# Patient Record
Sex: MALE | Race: WHITE | NOT HISPANIC OR LATINO | Employment: OTHER | URBAN - METROPOLITAN AREA
[De-identification: names, ages, dates, MRNs, and addresses within clinical notes are randomized per-mention and may not be internally consistent; named-entity substitution may affect disease eponyms.]

---

## 2024-01-23 ENCOUNTER — APPOINTMENT (OUTPATIENT)
Dept: CARDIOLOGY | Facility: HOSPITAL | Age: 69
DRG: 243 | End: 2024-01-23
Payer: MEDICARE

## 2024-01-23 ENCOUNTER — APPOINTMENT (OUTPATIENT)
Dept: RADIOLOGY | Facility: HOSPITAL | Age: 69
DRG: 243 | End: 2024-01-23
Payer: MEDICARE

## 2024-01-23 ENCOUNTER — HOSPITAL ENCOUNTER (INPATIENT)
Facility: HOSPITAL | Age: 69
LOS: 2 days | Discharge: HOME | DRG: 243 | End: 2024-01-25
Attending: STUDENT IN AN ORGANIZED HEALTH CARE EDUCATION/TRAINING PROGRAM | Admitting: INTERNAL MEDICINE
Payer: MEDICARE

## 2024-01-23 DIAGNOSIS — R33.8 BENIGN PROSTATIC HYPERPLASIA WITH URINARY RETENTION: ICD-10-CM

## 2024-01-23 DIAGNOSIS — N13.39 OTHER HYDRONEPHROSIS: ICD-10-CM

## 2024-01-23 DIAGNOSIS — I44.2 THIRD DEGREE HEART BLOCK (MULTI): Primary | ICD-10-CM

## 2024-01-23 DIAGNOSIS — N40.1 BENIGN PROSTATIC HYPERPLASIA WITH URINARY RETENTION: ICD-10-CM

## 2024-01-23 LAB
ABO GROUP (TYPE) IN BLOOD: NORMAL
ALBUMIN SERPL BCP-MCNC: 4.9 G/DL (ref 3.4–5)
ALP SERPL-CCNC: 94 U/L (ref 33–136)
ALT SERPL W P-5'-P-CCNC: 35 U/L (ref 10–52)
ANION GAP BLDV CALCULATED.4IONS-SCNC: 4 MMOL/L (ref 10–25)
ANION GAP SERPL CALC-SCNC: 13 MMOL/L (ref 10–20)
ANTIBODY SCREEN: NORMAL
APTT PPP: 29 SECONDS (ref 27–38)
AST SERPL W P-5'-P-CCNC: 32 U/L (ref 9–39)
BASE EXCESS BLDV CALC-SCNC: 4.5 MMOL/L (ref -2–3)
BASOPHILS # BLD AUTO: 0.05 X10*3/UL (ref 0–0.1)
BASOPHILS NFR BLD AUTO: 0.5 %
BILIRUB SERPL-MCNC: 0.5 MG/DL (ref 0–1.2)
BNP SERPL-MCNC: 283 PG/ML (ref 0–99)
BODY TEMPERATURE: ABNORMAL
BUN SERPL-MCNC: 17 MG/DL (ref 6–23)
CA-I BLDV-SCNC: 1.3 MMOL/L (ref 1.1–1.33)
CALCIUM SERPL-MCNC: 10.5 MG/DL (ref 8.6–10.3)
CARDIAC TROPONIN I PNL SERPL HS: 28 NG/L (ref 0–20)
CARDIAC TROPONIN I PNL SERPL HS: 30 NG/L (ref 0–20)
CHLORIDE BLDV-SCNC: 103 MMOL/L (ref 98–107)
CHLORIDE SERPL-SCNC: 98 MMOL/L (ref 98–107)
CHOLEST SERPL-MCNC: 133 MG/DL (ref 0–199)
CHOLESTEROL/HDL RATIO: 3.3
CO2 SERPL-SCNC: 30 MMOL/L (ref 21–32)
CREAT SERPL-MCNC: 1.11 MG/DL (ref 0.5–1.3)
EGFRCR SERPLBLD CKD-EPI 2021: 72 ML/MIN/1.73M*2
EOSINOPHIL # BLD AUTO: 0.15 X10*3/UL (ref 0–0.7)
EOSINOPHIL NFR BLD AUTO: 1.6 %
ERYTHROCYTE [DISTWIDTH] IN BLOOD BY AUTOMATED COUNT: 13.5 % (ref 11.5–14.5)
FLUAV RNA RESP QL NAA+PROBE: NOT DETECTED
FLUBV RNA RESP QL NAA+PROBE: NOT DETECTED
GLUCOSE BLDV-MCNC: 87 MG/DL (ref 74–99)
GLUCOSE SERPL-MCNC: 81 MG/DL (ref 74–99)
HCO3 BLDV-SCNC: 32 MMOL/L (ref 22–26)
HCT VFR BLD AUTO: 46.8 % (ref 41–52)
HCT VFR BLD EST: 46 % (ref 41–52)
HDLC SERPL-MCNC: 40.5 MG/DL
HGB BLD-MCNC: 15.5 G/DL (ref 13.5–17.5)
HGB BLDV-MCNC: 15.3 G/DL (ref 13.5–17.5)
IMM GRANULOCYTES # BLD AUTO: 0.04 X10*3/UL (ref 0–0.7)
IMM GRANULOCYTES NFR BLD AUTO: 0.4 % (ref 0–0.9)
INHALED O2 CONCENTRATION: 21 %
INR PPP: 1 (ref 0.9–1.1)
LACTATE BLDV-SCNC: 1.6 MMOL/L (ref 0.4–2)
LDLC SERPL CALC-MCNC: 74 MG/DL
LYMPHOCYTES # BLD AUTO: 1.65 X10*3/UL (ref 1.2–4.8)
LYMPHOCYTES NFR BLD AUTO: 17.8 %
MCH RBC QN AUTO: 31.6 PG (ref 26–34)
MCHC RBC AUTO-ENTMCNC: 33.1 G/DL (ref 32–36)
MCV RBC AUTO: 95 FL (ref 80–100)
MONOCYTES # BLD AUTO: 0.96 X10*3/UL (ref 0.1–1)
MONOCYTES NFR BLD AUTO: 10.3 %
NEUTROPHILS # BLD AUTO: 6.44 X10*3/UL (ref 1.2–7.7)
NEUTROPHILS NFR BLD AUTO: 69.4 %
NON HDL CHOLESTEROL: 93 MG/DL (ref 0–149)
NRBC BLD-RTO: 0 /100 WBCS (ref 0–0)
OXYHGB MFR BLDV: 29.7 % (ref 45–75)
PCO2 BLDV: 58 MM HG (ref 41–51)
PH BLDV: 7.35 PH (ref 7.33–7.43)
PLATELET # BLD AUTO: 287 X10*3/UL (ref 150–450)
PO2 BLDV: 22 MM HG (ref 35–45)
POTASSIUM BLDV-SCNC: 4.8 MMOL/L (ref 3.5–5.3)
POTASSIUM SERPL-SCNC: 4.5 MMOL/L (ref 3.5–5.3)
PROT SERPL-MCNC: 8.5 G/DL (ref 6.4–8.2)
PROTHROMBIN TIME: 11.7 SECONDS (ref 9.8–12.8)
RBC # BLD AUTO: 4.91 X10*6/UL (ref 4.5–5.9)
RH FACTOR (ANTIGEN D): NORMAL
SAO2 % BLDV: 30 % (ref 45–75)
SARS-COV-2 RNA RESP QL NAA+PROBE: NOT DETECTED
SODIUM BLDV-SCNC: 134 MMOL/L (ref 136–145)
SODIUM SERPL-SCNC: 136 MMOL/L (ref 136–145)
TRIGL SERPL-MCNC: 92 MG/DL (ref 0–149)
TSH SERPL-ACNC: 2.61 MIU/L (ref 0.44–3.98)
VLDL: 18 MG/DL (ref 0–40)
WBC # BLD AUTO: 9.3 X10*3/UL (ref 4.4–11.3)

## 2024-01-23 PROCEDURE — 71275 CT ANGIOGRAPHY CHEST: CPT

## 2024-01-23 PROCEDURE — 2020000001 HC ICU ROOM DAILY

## 2024-01-23 PROCEDURE — 84484 ASSAY OF TROPONIN QUANT: CPT | Performed by: STUDENT IN AN ORGANIZED HEALTH CARE EDUCATION/TRAINING PROGRAM

## 2024-01-23 PROCEDURE — 2550000001 HC RX 255 CONTRASTS: Performed by: STUDENT IN AN ORGANIZED HEALTH CARE EDUCATION/TRAINING PROGRAM

## 2024-01-23 PROCEDURE — 85730 THROMBOPLASTIN TIME PARTIAL: CPT | Mod: 91 | Performed by: STUDENT IN AN ORGANIZED HEALTH CARE EDUCATION/TRAINING PROGRAM

## 2024-01-23 PROCEDURE — 84132 ASSAY OF SERUM POTASSIUM: CPT | Performed by: STUDENT IN AN ORGANIZED HEALTH CARE EDUCATION/TRAINING PROGRAM

## 2024-01-23 PROCEDURE — 74177 CT ABD & PELVIS W/CONTRAST: CPT | Performed by: RADIOLOGY

## 2024-01-23 PROCEDURE — 36415 COLL VENOUS BLD VENIPUNCTURE: CPT | Performed by: STUDENT IN AN ORGANIZED HEALTH CARE EDUCATION/TRAINING PROGRAM

## 2024-01-23 PROCEDURE — 99291 CRITICAL CARE FIRST HOUR: CPT | Performed by: STUDENT IN AN ORGANIZED HEALTH CARE EDUCATION/TRAINING PROGRAM

## 2024-01-23 PROCEDURE — 83880 ASSAY OF NATRIURETIC PEPTIDE: CPT

## 2024-01-23 PROCEDURE — 76942 ECHO GUIDE FOR BIOPSY: CPT | Performed by: INTERNAL MEDICINE

## 2024-01-23 PROCEDURE — 2720000007 HC OR 272 NO HCPCS: Performed by: INTERNAL MEDICINE

## 2024-01-23 PROCEDURE — C1894 INTRO/SHEATH, NON-LASER: HCPCS | Performed by: INTERNAL MEDICINE

## 2024-01-23 PROCEDURE — 84443 ASSAY THYROID STIM HORMONE: CPT

## 2024-01-23 PROCEDURE — 87636 SARSCOV2 & INF A&B AMP PRB: CPT | Performed by: STUDENT IN AN ORGANIZED HEALTH CARE EDUCATION/TRAINING PROGRAM

## 2024-01-23 PROCEDURE — 94760 N-INVAS EAR/PLS OXIMETRY 1: CPT

## 2024-01-23 PROCEDURE — 86900 BLOOD TYPING SEROLOGIC ABO: CPT | Mod: 91 | Performed by: STUDENT IN AN ORGANIZED HEALTH CARE EDUCATION/TRAINING PROGRAM

## 2024-01-23 PROCEDURE — 93005 ELECTROCARDIOGRAM TRACING: CPT

## 2024-01-23 PROCEDURE — 5A1223Z PERFORMANCE OF CARDIAC PACING, CONTINUOUS: ICD-10-PCS | Performed by: INTERNAL MEDICINE

## 2024-01-23 PROCEDURE — 85025 COMPLETE CBC W/AUTO DIFF WBC: CPT | Performed by: STUDENT IN AN ORGANIZED HEALTH CARE EDUCATION/TRAINING PROGRAM

## 2024-01-23 PROCEDURE — 2500000005 HC RX 250 GENERAL PHARMACY W/O HCPCS: Performed by: INTERNAL MEDICINE

## 2024-01-23 PROCEDURE — 2500000004 HC RX 250 GENERAL PHARMACY W/ HCPCS (ALT 636 FOR OP/ED): Performed by: STUDENT IN AN ORGANIZED HEALTH CARE EDUCATION/TRAINING PROGRAM

## 2024-01-23 PROCEDURE — 02HK3JZ INSERTION OF PACEMAKER LEAD INTO RIGHT VENTRICLE, PERCUTANEOUS APPROACH: ICD-10-PCS | Performed by: INTERNAL MEDICINE

## 2024-01-23 PROCEDURE — 71275 CT ANGIOGRAPHY CHEST: CPT | Performed by: RADIOLOGY

## 2024-01-23 PROCEDURE — 74177 CT ABD & PELVIS W/CONTRAST: CPT

## 2024-01-23 PROCEDURE — 33210 INSERT ELECTRD/PM CATH SNGL: CPT | Performed by: INTERNAL MEDICINE

## 2024-01-23 PROCEDURE — 83036 HEMOGLOBIN GLYCOSYLATED A1C: CPT | Mod: GEALAB

## 2024-01-23 PROCEDURE — 80061 LIPID PANEL: CPT

## 2024-01-23 RX ORDER — LIDOCAINE HYDROCHLORIDE 20 MG/ML
INJECTION, SOLUTION INFILTRATION; PERINEURAL AS NEEDED
Status: DISCONTINUED | OUTPATIENT
Start: 2024-01-23 | End: 2024-01-23 | Stop reason: HOSPADM

## 2024-01-23 RX ORDER — FUROSEMIDE 10 MG/ML
40 INJECTION INTRAMUSCULAR; INTRAVENOUS EVERY 12 HOURS
Status: DISCONTINUED | OUTPATIENT
Start: 2024-01-24 | End: 2024-01-25

## 2024-01-23 RX ORDER — FUROSEMIDE 10 MG/ML
40 INJECTION INTRAMUSCULAR; INTRAVENOUS ONCE
Status: COMPLETED | OUTPATIENT
Start: 2024-01-23 | End: 2024-01-23

## 2024-01-23 RX ORDER — DEXTROSE MONOHYDRATE 100 MG/ML
0.3 INJECTION, SOLUTION INTRAVENOUS ONCE AS NEEDED
Status: DISCONTINUED | OUTPATIENT
Start: 2024-01-23 | End: 2024-01-25 | Stop reason: HOSPADM

## 2024-01-23 RX ORDER — DEXTROSE 50 % IN WATER (D50W) INTRAVENOUS SYRINGE
25
Status: DISCONTINUED | OUTPATIENT
Start: 2024-01-23 | End: 2024-01-25 | Stop reason: HOSPADM

## 2024-01-23 RX ORDER — ATROPINE SULFATE 0.4 MG/ML
0.4 INJECTION, SOLUTION ENDOTRACHEAL; INTRAMEDULLARY; INTRAMUSCULAR; INTRAVENOUS; SUBCUTANEOUS ONCE
Status: COMPLETED | OUTPATIENT
Start: 2024-01-23 | End: 2024-01-23

## 2024-01-23 RX ADMIN — ATROPINE SULFATE 0.4 MG: 0.4 INJECTION, SOLUTION INTRAVENOUS at 19:06

## 2024-01-23 RX ADMIN — IOHEXOL 90 ML: 350 INJECTION, SOLUTION INTRAVENOUS at 19:59

## 2024-01-23 RX ADMIN — FUROSEMIDE 40 MG: 10 INJECTION, SOLUTION INTRAMUSCULAR; INTRAVENOUS at 19:06

## 2024-01-23 ASSESSMENT — ACTIVITIES OF DAILY LIVING (ADL)
HEARING - LEFT EAR: FUNCTIONAL
PATIENT'S MEMORY ADEQUATE TO SAFELY COMPLETE DAILY ACTIVITIES?: YES
BATHING: INDEPENDENT
FEEDING YOURSELF: INDEPENDENT
ADEQUATE_TO_COMPLETE_ADL: YES
HEARING - RIGHT EAR: FUNCTIONAL
TOILETING: INDEPENDENT
WALKS IN HOME: INDEPENDENT
GROOMING: INDEPENDENT
DRESSING YOURSELF: INDEPENDENT
JUDGMENT_ADEQUATE_SAFELY_COMPLETE_DAILY_ACTIVITIES: YES

## 2024-01-23 ASSESSMENT — LIFESTYLE VARIABLES
HAVE YOU EVER FELT YOU SHOULD CUT DOWN ON YOUR DRINKING: NO
EVER FELT BAD OR GUILTY ABOUT YOUR DRINKING: NO
HAVE PEOPLE ANNOYED YOU BY CRITICIZING YOUR DRINKING: NO
REASON UNABLE TO ASSESS: NO
EVER HAD A DRINK FIRST THING IN THE MORNING TO STEADY YOUR NERVES TO GET RID OF A HANGOVER: NO

## 2024-01-23 ASSESSMENT — COLUMBIA-SUICIDE SEVERITY RATING SCALE - C-SSRS
6. HAVE YOU EVER DONE ANYTHING, STARTED TO DO ANYTHING, OR PREPARED TO DO ANYTHING TO END YOUR LIFE?: NO
1. IN THE PAST MONTH, HAVE YOU WISHED YOU WERE DEAD OR WISHED YOU COULD GO TO SLEEP AND NOT WAKE UP?: NO
2. HAVE YOU ACTUALLY HAD ANY THOUGHTS OF KILLING YOURSELF?: NO

## 2024-01-23 ASSESSMENT — COGNITIVE AND FUNCTIONAL STATUS - GENERAL
DAILY ACTIVITIY SCORE: 24
PATIENT BASELINE BEDBOUND: NO
MOBILITY SCORE: 24

## 2024-01-23 ASSESSMENT — PAIN DESCRIPTION - LOCATION: LOCATION: CHEST

## 2024-01-23 ASSESSMENT — PAIN SCALES - GENERAL: PAINLEVEL_OUTOF10: 8

## 2024-01-23 ASSESSMENT — PAIN - FUNCTIONAL ASSESSMENT: PAIN_FUNCTIONAL_ASSESSMENT: 0-10

## 2024-01-23 NOTE — Clinical Note
The ECG shows a paced rhythm. The patient has temporary pacemaker. Pacer on demand mode. ECG rate  = 80 bpm.

## 2024-01-23 NOTE — Clinical Note
The PACEMAKER, GENERATOR, DUAL ASSURITY MRI - [id number] device was inserted. The leads were placed into the connector and visually verified to be in correct position. Injury current obtained.

## 2024-01-23 NOTE — Clinical Note
Temporary pacemaker inserted tested. Temporary pacemaker location through right femoral vein. Temporary pacemaker connected to demand mode. Heart rate: 60. Current 3 (mA). Centimeters 60.

## 2024-01-23 NOTE — Clinical Note
The ECG shows a paced rhythm. Pacer turned on. The patient has permanent pacemaker. Pacemaker at 5 mA. Pacer on demand mode. ECG rate  = 80 bpm.

## 2024-01-23 NOTE — Clinical Note
Sheath was inserted in the right femoral vein. Ultrasound guidance was used. Micro and exchanged for 6.5Fr

## 2024-01-23 NOTE — ED NOTES
Pt arrives with c/o chest pain, SOB and edema to bilateral feet. Pt found to be in a 3rd degree heart block on EKS with a HR of 30. Pt currently A&OX4. Pt in no obvious distress at this time.      Richard Pinto RN  01/23/24 2533

## 2024-01-24 ENCOUNTER — APPOINTMENT (OUTPATIENT)
Dept: RADIOLOGY | Facility: HOSPITAL | Age: 69
DRG: 243 | End: 2024-01-24
Payer: MEDICARE

## 2024-01-24 ENCOUNTER — APPOINTMENT (OUTPATIENT)
Dept: CARDIOLOGY | Facility: HOSPITAL | Age: 69
DRG: 243 | End: 2024-01-24
Payer: MEDICARE

## 2024-01-24 LAB
ALBUMIN SERPL BCP-MCNC: 3.9 G/DL (ref 3.4–5)
ANION GAP SERPL CALC-SCNC: 14 MMOL/L (ref 10–20)
AORTIC VALVE MEAN GRADIENT: 1 MMHG
AORTIC VALVE PEAK VELOCITY: 0.76 M/S
AV PEAK GRADIENT: 2.3 MMHG
AVA (PEAK VEL): 2.49 CM2
AVA (VTI): 2.4 CM2
BUN SERPL-MCNC: 16 MG/DL (ref 6–23)
CALCIUM SERPL-MCNC: 9.5 MG/DL (ref 8.6–10.3)
CHLORIDE SERPL-SCNC: 101 MMOL/L (ref 98–107)
CO2 SERPL-SCNC: 28 MMOL/L (ref 21–32)
CREAT SERPL-MCNC: 1 MG/DL (ref 0.5–1.3)
EGFRCR SERPLBLD CKD-EPI 2021: 81 ML/MIN/1.73M*2
ERYTHROCYTE [DISTWIDTH] IN BLOOD BY AUTOMATED COUNT: 13.2 % (ref 11.5–14.5)
EST. AVERAGE GLUCOSE BLD GHB EST-MCNC: 120 MG/DL
GLUCOSE SERPL-MCNC: 98 MG/DL (ref 74–99)
HBA1C MFR BLD: 5.8 %
HCT VFR BLD AUTO: 44.1 % (ref 41–52)
HGB BLD-MCNC: 15.2 G/DL (ref 13.5–17.5)
LEFT VENTRICLE INTERNAL DIMENSION DIASTOLE: 3.28 CM (ref 3.5–6)
LEFT VENTRICULAR OUTFLOW TRACT DIAMETER: 2 CM
MAGNESIUM SERPL-MCNC: 1.73 MG/DL (ref 1.6–2.4)
MCH RBC QN AUTO: 31.5 PG (ref 26–34)
MCHC RBC AUTO-ENTMCNC: 34.5 G/DL (ref 32–36)
MCV RBC AUTO: 91 FL (ref 80–100)
MITRAL VALVE E/A RATIO: 0.83
NRBC BLD-RTO: 0 /100 WBCS (ref 0–0)
PHOSPHATE SERPL-MCNC: 3.8 MG/DL (ref 2.5–4.9)
PLATELET # BLD AUTO: 283 X10*3/UL (ref 150–450)
POTASSIUM SERPL-SCNC: 3.8 MMOL/L (ref 3.5–5.3)
RBC # BLD AUTO: 4.83 X10*6/UL (ref 4.5–5.9)
RIGHT VENTRICLE PEAK SYSTOLIC PRESSURE: 37.2 MMHG
SODIUM SERPL-SCNC: 139 MMOL/L (ref 136–145)
WBC # BLD AUTO: 8.4 X10*3/UL (ref 4.4–11.3)

## 2024-01-24 PROCEDURE — 93005 ELECTROCARDIOGRAM TRACING: CPT

## 2024-01-24 PROCEDURE — 33208 INSRT HEART PM ATRIAL & VENT: CPT | Performed by: INTERNAL MEDICINE

## 2024-01-24 PROCEDURE — 2500000004 HC RX 250 GENERAL PHARMACY W/ HCPCS (ALT 636 FOR OP/ED)

## 2024-01-24 PROCEDURE — 0JH606Z INSERTION OF PACEMAKER, DUAL CHAMBER INTO CHEST SUBCUTANEOUS TISSUE AND FASCIA, OPEN APPROACH: ICD-10-PCS | Performed by: INTERNAL MEDICINE

## 2024-01-24 PROCEDURE — 2550000001 HC RX 255 CONTRASTS: Performed by: INTERNAL MEDICINE

## 2024-01-24 PROCEDURE — 2500000005 HC RX 250 GENERAL PHARMACY W/O HCPCS: Performed by: INTERNAL MEDICINE

## 2024-01-24 PROCEDURE — 85027 COMPLETE CBC AUTOMATED: CPT

## 2024-01-24 PROCEDURE — 93306 TTE W/DOPPLER COMPLETE: CPT

## 2024-01-24 PROCEDURE — 33234 REMOVAL OF PACEMAKER SYSTEM: CPT | Performed by: INTERNAL MEDICINE

## 2024-01-24 PROCEDURE — C1785 PMKR, DUAL, RATE-RESP: HCPCS | Performed by: INTERNAL MEDICINE

## 2024-01-24 PROCEDURE — 99222 1ST HOSP IP/OBS MODERATE 55: CPT | Performed by: INTERNAL MEDICINE

## 2024-01-24 PROCEDURE — 2020000001 HC ICU ROOM DAILY

## 2024-01-24 PROCEDURE — 99152 MOD SED SAME PHYS/QHP 5/>YRS: CPT | Performed by: INTERNAL MEDICINE

## 2024-01-24 PROCEDURE — 71045 X-RAY EXAM CHEST 1 VIEW: CPT

## 2024-01-24 PROCEDURE — C1894 INTRO/SHEATH, NON-LASER: HCPCS | Performed by: INTERNAL MEDICINE

## 2024-01-24 PROCEDURE — 93010 ELECTROCARDIOGRAM REPORT: CPT | Performed by: INTERNAL MEDICINE

## 2024-01-24 PROCEDURE — 2780000003 HC OR 278 NO HCPCS: Performed by: INTERNAL MEDICINE

## 2024-01-24 PROCEDURE — 2750000001 HC OR 275 NO HCPCS: Performed by: INTERNAL MEDICINE

## 2024-01-24 PROCEDURE — 80069 RENAL FUNCTION PANEL: CPT

## 2024-01-24 PROCEDURE — 37799 UNLISTED PX VASCULAR SURGERY: CPT

## 2024-01-24 PROCEDURE — 02H63JZ INSERTION OF PACEMAKER LEAD INTO RIGHT ATRIUM, PERCUTANEOUS APPROACH: ICD-10-PCS | Performed by: INTERNAL MEDICINE

## 2024-01-24 PROCEDURE — 02HK3JZ INSERTION OF PACEMAKER LEAD INTO RIGHT VENTRICLE, PERCUTANEOUS APPROACH: ICD-10-PCS | Performed by: INTERNAL MEDICINE

## 2024-01-24 PROCEDURE — 99291 CRITICAL CARE FIRST HOUR: CPT

## 2024-01-24 PROCEDURE — 2500000001 HC RX 250 WO HCPCS SELF ADMINISTERED DRUGS (ALT 637 FOR MEDICARE OP)

## 2024-01-24 PROCEDURE — 93306 TTE W/DOPPLER COMPLETE: CPT | Performed by: INTERNAL MEDICINE

## 2024-01-24 PROCEDURE — 99153 MOD SED SAME PHYS/QHP EA: CPT | Performed by: INTERNAL MEDICINE

## 2024-01-24 PROCEDURE — 83735 ASSAY OF MAGNESIUM: CPT

## 2024-01-24 PROCEDURE — 2720000007 HC OR 272 NO HCPCS: Performed by: INTERNAL MEDICINE

## 2024-01-24 PROCEDURE — C1898 LEAD, PMKR, OTHER THAN TRANS: HCPCS | Performed by: INTERNAL MEDICINE

## 2024-01-24 PROCEDURE — 36005 INJECTION EXT VENOGRAPHY: CPT | Performed by: INTERNAL MEDICINE

## 2024-01-24 PROCEDURE — C1892 INTRO/SHEATH,FIXED,PEEL-AWAY: HCPCS | Performed by: INTERNAL MEDICINE

## 2024-01-24 PROCEDURE — 2500000004 HC RX 250 GENERAL PHARMACY W/ HCPCS (ALT 636 FOR OP/ED): Performed by: INTERNAL MEDICINE

## 2024-01-24 PROCEDURE — 71045 X-RAY EXAM CHEST 1 VIEW: CPT | Performed by: RADIOLOGY

## 2024-01-24 PROCEDURE — 2500000005 HC RX 250 GENERAL PHARMACY W/O HCPCS

## 2024-01-24 DEVICE — PACING LEAD
Type: IMPLANTABLE DEVICE | Site: HEART | Status: FUNCTIONAL
Brand: TENDRIL™

## 2024-01-24 DEVICE — PULSE GENERATOR
Type: IMPLANTABLE DEVICE | Site: CHEST | Status: FUNCTIONAL
Brand: ASSURITY MRI™

## 2024-01-24 DEVICE — PACING LEAD
Type: IMPLANTABLE DEVICE | Site: CHEST | Status: FUNCTIONAL
Brand: TENDRIL™

## 2024-01-24 RX ORDER — CYCLOBENZAPRINE HCL 10 MG
5 TABLET ORAL NIGHTLY
Status: DISCONTINUED | OUTPATIENT
Start: 2024-01-24 | End: 2024-01-25

## 2024-01-24 RX ORDER — VANCOMYCIN HYDROCHLORIDE 1 G/200ML
INJECTION, SOLUTION INTRAVENOUS CONTINUOUS PRN
Status: DISCONTINUED | OUTPATIENT
Start: 2024-01-24 | End: 2024-01-24 | Stop reason: HOSPADM

## 2024-01-24 RX ORDER — MIDAZOLAM HYDROCHLORIDE 1 MG/ML
INJECTION, SOLUTION INTRAMUSCULAR; INTRAVENOUS AS NEEDED
Status: DISCONTINUED | OUTPATIENT
Start: 2024-01-24 | End: 2024-01-24 | Stop reason: HOSPADM

## 2024-01-24 RX ORDER — LIDOCAINE 560 MG/1
1 PATCH PERCUTANEOUS; TOPICAL; TRANSDERMAL DAILY
Status: DISCONTINUED | OUTPATIENT
Start: 2024-01-24 | End: 2024-01-25 | Stop reason: HOSPADM

## 2024-01-24 RX ORDER — LIDOCAINE HYDROCHLORIDE 20 MG/ML
INJECTION, SOLUTION INFILTRATION; PERINEURAL AS NEEDED
Status: DISCONTINUED | OUTPATIENT
Start: 2024-01-24 | End: 2024-01-24 | Stop reason: HOSPADM

## 2024-01-24 RX ORDER — TAMSULOSIN HYDROCHLORIDE 0.4 MG/1
0.4 CAPSULE ORAL DAILY
Status: DISCONTINUED | OUTPATIENT
Start: 2024-01-24 | End: 2024-01-25 | Stop reason: HOSPADM

## 2024-01-24 RX ORDER — FENTANYL CITRATE 50 UG/ML
INJECTION, SOLUTION INTRAMUSCULAR; INTRAVENOUS AS NEEDED
Status: DISCONTINUED | OUTPATIENT
Start: 2024-01-24 | End: 2024-01-24 | Stop reason: HOSPADM

## 2024-01-24 RX ORDER — ENOXAPARIN SODIUM 100 MG/ML
40 INJECTION SUBCUTANEOUS EVERY 24 HOURS
Status: DISCONTINUED | OUTPATIENT
Start: 2024-01-24 | End: 2024-01-25 | Stop reason: HOSPADM

## 2024-01-24 RX ORDER — ONDANSETRON HYDROCHLORIDE 2 MG/ML
INJECTION, SOLUTION INTRAVENOUS AS NEEDED
Status: DISCONTINUED | OUTPATIENT
Start: 2024-01-24 | End: 2024-01-24 | Stop reason: HOSPADM

## 2024-01-24 RX ORDER — KETOROLAC TROMETHAMINE 30 MG/ML
15 INJECTION, SOLUTION INTRAMUSCULAR; INTRAVENOUS ONCE
Status: COMPLETED | OUTPATIENT
Start: 2024-01-24 | End: 2024-01-24

## 2024-01-24 RX ORDER — SODIUM CHLORIDE 9 MG/ML
INJECTION, SOLUTION INTRAVENOUS CONTINUOUS PRN
Status: DISCONTINUED | OUTPATIENT
Start: 2024-01-24 | End: 2024-01-24 | Stop reason: HOSPADM

## 2024-01-24 RX ORDER — CYCLOBENZAPRINE HCL 10 MG
5 TABLET ORAL ONCE
Status: COMPLETED | OUTPATIENT
Start: 2024-01-24 | End: 2024-01-24

## 2024-01-24 RX ADMIN — CYCLOBENZAPRINE 5 MG: 10 TABLET, FILM COATED ORAL at 20:07

## 2024-01-24 RX ADMIN — CYCLOBENZAPRINE 5 MG: 10 TABLET, FILM COATED ORAL at 09:46

## 2024-01-24 RX ADMIN — FUROSEMIDE 40 MG: 10 INJECTION, SOLUTION INTRAMUSCULAR; INTRAVENOUS at 20:07

## 2024-01-24 RX ADMIN — KETOROLAC TROMETHAMINE 15 MG: 30 INJECTION, SOLUTION INTRAMUSCULAR; INTRAVENOUS at 09:47

## 2024-01-24 RX ADMIN — LIDOCAINE 1 PATCH: 4 PATCH TOPICAL at 09:45

## 2024-01-24 RX ADMIN — ENOXAPARIN SODIUM 40 MG: 40 INJECTION SUBCUTANEOUS at 09:46

## 2024-01-24 RX ADMIN — PERFLUTREN 1 ML OF DILUTION: 6.52 INJECTION, SUSPENSION INTRAVENOUS at 11:05

## 2024-01-24 RX ADMIN — TAMSULOSIN HYDROCHLORIDE 0.4 MG: 0.4 CAPSULE ORAL at 12:29

## 2024-01-24 RX ADMIN — FUROSEMIDE 40 MG: 10 INJECTION, SOLUTION INTRAMUSCULAR; INTRAVENOUS at 08:23

## 2024-01-24 SDOH — SOCIAL STABILITY: SOCIAL INSECURITY: DO YOU FEEL ANYONE HAS EXPLOITED OR TAKEN ADVANTAGE OF YOU FINANCIALLY OR OF YOUR PERSONAL PROPERTY?: NO

## 2024-01-24 SDOH — SOCIAL STABILITY: SOCIAL INSECURITY: HAVE YOU HAD THOUGHTS OF HARMING ANYONE ELSE?: NO

## 2024-01-24 SDOH — SOCIAL STABILITY: SOCIAL INSECURITY: DO YOU FEEL UNSAFE GOING BACK TO THE PLACE WHERE YOU ARE LIVING?: NO

## 2024-01-24 SDOH — SOCIAL STABILITY: SOCIAL INSECURITY: ARE YOU OR HAVE YOU BEEN THREATENED OR ABUSED PHYSICALLY, EMOTIONALLY, OR SEXUALLY BY ANYONE?: NO

## 2024-01-24 SDOH — SOCIAL STABILITY: SOCIAL INSECURITY: DOES ANYONE TRY TO KEEP YOU FROM HAVING/CONTACTING OTHER FRIENDS OR DOING THINGS OUTSIDE YOUR HOME?: NO

## 2024-01-24 SDOH — SOCIAL STABILITY: SOCIAL INSECURITY: HAS ANYONE EVER THREATENED TO HURT YOUR FAMILY OR YOUR PETS?: NO

## 2024-01-24 SDOH — SOCIAL STABILITY: SOCIAL INSECURITY: ARE THERE ANY APPARENT SIGNS OF INJURIES/BEHAVIORS THAT COULD BE RELATED TO ABUSE/NEGLECT?: NO

## 2024-01-24 SDOH — SOCIAL STABILITY: SOCIAL INSECURITY: ABUSE: ADULT

## 2024-01-24 ASSESSMENT — PATIENT HEALTH QUESTIONNAIRE - PHQ9
1. LITTLE INTEREST OR PLEASURE IN DOING THINGS: NOT AT ALL
2. FEELING DOWN, DEPRESSED OR HOPELESS: NOT AT ALL
SUM OF ALL RESPONSES TO PHQ9 QUESTIONS 1 & 2: 0

## 2024-01-24 ASSESSMENT — PAIN - FUNCTIONAL ASSESSMENT
PAIN_FUNCTIONAL_ASSESSMENT: 0-10
PAIN_FUNCTIONAL_ASSESSMENT: 0-10

## 2024-01-24 ASSESSMENT — LIFESTYLE VARIABLES
AUDIT-C TOTAL SCORE: 1
HOW OFTEN DO YOU HAVE A DRINK CONTAINING ALCOHOL: MONTHLY OR LESS
HOW MANY STANDARD DRINKS CONTAINING ALCOHOL DO YOU HAVE ON A TYPICAL DAY: 1 OR 2
SUBSTANCE_ABUSE_PAST_12_MONTHS: YES
HOW OFTEN DO YOU HAVE 6 OR MORE DRINKS ON ONE OCCASION: NEVER
PRESCIPTION_ABUSE_PAST_12_MONTHS: NO
AUDIT-C TOTAL SCORE: 1
SKIP TO QUESTIONS 9-10: 1

## 2024-01-24 ASSESSMENT — PAIN SCALES - GENERAL
PAINLEVEL_OUTOF10: 0 - NO PAIN
PAINLEVEL_OUTOF10: 0 - NO PAIN
PAINLEVEL_OUTOF10: 2
PAINLEVEL_OUTOF10: 8

## 2024-01-24 ASSESSMENT — ACTIVITIES OF DAILY LIVING (ADL)
LACK_OF_TRANSPORTATION: NO
LACK_OF_TRANSPORTATION: NO

## 2024-01-24 NOTE — ED NOTES
Report called to ICU, MARY Cosby received report. This writer called the cath lab, cath lab requested this writer to hold pt in the ED for 10-15 minutes for orders to be placed for temporary pacemaker placement. This writer will hold pt in the ED and await cath lab to call this writer back.      Richard Pinto RN  01/23/24 5465

## 2024-01-24 NOTE — OP NOTE
Temporary Pacemaker Insertion Operative Note     Date: 2024  OR Location: [facility] Cardiac Cath Lab    Name: Timmy Mayer, : 1955, Age: 68 y.o., MRN: [id number], Sex: male    Diagnosis  Pre-op Diagnosis     * Third degree heart block (CMS/HCC) [I44.2] Post-op Diagnosis     * Third degree heart block (CMS/HCC) [I44.2]     Procedures    * Temporary Pacemaker Insertion    Surgeons      * Johnny Adan - Primary    Resident/Fellow/Other Assistant:  Surgeon(s) and Role:    Procedure Summary  Anesthesia: Moderate Sedation  ASA: ASA status not filed in the log.  Anesthesia Staff: No anesthesia staff entered.  Estimated Blood Loss: 10 mL  Intra-op Medications: * Intraprocedure medication information is unavailable because the case start and end events have not been set *      Intraprocedure I/O Totals       None           Specimen: No specimens collected     Staff:   Circulator: Shital Casas RN  Scrub Person: Enrrique Edwards RT         Drains and/or Catheters: * None in log *    Tourniquet Times:         Implants:     Findings: Complete heart block    Indications: Timmy Mayer is an 68 y.o. male who is having surgery for Third degree heart block (CMS/HCC) [I44.2].  The risks, benefits, and alternatives were explained in full to the patient.  The patient was brought emergently from the emergency department to the cardiac catheterization laboratory.  Right common femoral vein access was obtained under ultrasound guidance.  A transvenous pacemaker was placed in the apex of the right ventricle abutting the septum.  Threshold capture was 0.6 mA.  Set to 60 bpm, 2.5 mA.    Procedure Details: As above  Complications:  None; patient tolerated the procedure well.    Disposition:  ICU-bedrest until permanent pacemaker placed  Condition: stable         Additional Details: None    Attending Attestation: I was present and scrubbed for the entire procedure.    Johnny Adan  Phone Number: [phone]

## 2024-01-24 NOTE — POST-PROCEDURE NOTE
Physician Transition of Care Summary  Invasive Cardiovascular Lab    Procedure Date: 1/23/2024  Attending:    * Johnny Adan - Primary  Resident/Fellow/Other Assistant: Surgeon(s) and Role:    Indications:   Pre-op Diagnosis     * Third degree heart block (CMS/HCC) [I44.2]    Post-procedure diagnosis:   Post-op Diagnosis     * Third degree heart block (CMS/HCC) [I44.2]    Procedure(s):     * Temporary Pacemaker Insertion      Procedure Findings:   Complete heart block    Description of the Procedure:   Transvenous pacemaker    Complications:   None    Stents/Implants:       Anticoagulation/Antiplatelet Plan:   -    Estimated Blood Loss:   10 mL    Anesthesia: Moderate Sedation Anesthesia Staff: No anesthesia staff entered.    Any Specimen(s) Removed:   No specimens collected during this procedure.    Disposition:   Check TTE in AM. Post cath order set. Strict bedrest until PPM placed.      Electronically signed by: Johnny Adan MD, 1/23/2024 9:51 PM

## 2024-01-24 NOTE — PROGRESS NOTES
Patient: Timmy Mayer Age: 68 y.o.   Gender: male Room/bed: 10/10-A     Attending: Yassine Goddard DO  Code Status:  Full Code    Overnight Events     Asymptomatic SMVT vs afib with abberency     Subjective   Doing well today, on RA. Improved LE edema and dyspnea. No chest pain, palpitations. Had BM yesterday.     Objective    Physical Exam  Constitutional:       General: He is not in acute distress.     Appearance: Normal appearance.   HENT:      Head: Normocephalic and atraumatic.      Right Ear: Tympanic membrane, ear canal and external ear normal.      Left Ear: Tympanic membrane, ear canal and external ear normal.      Nose: Nose normal.      Mouth/Throat:      Mouth: Mucous membranes are moist.      Pharynx: Oropharynx is clear.   Eyes:      General: No scleral icterus.     Extraocular Movements: Extraocular movements intact.      Conjunctiva/sclera: Conjunctivae normal.      Pupils: Pupils are equal, round, and reactive to light.   Cardiovascular:      Rate and Rhythm: Normal rate and regular rhythm.      Pulses: Normal pulses.      Heart sounds: Normal heart sounds. No murmur heard.     No gallop.   Pulmonary:      Effort: Pulmonary effort is normal.      Breath sounds: Normal breath sounds.   Abdominal:      General: Abdomen is flat. Bowel sounds are normal.      Palpations: Abdomen is soft.      Tenderness: There is no abdominal tenderness. There is no guarding or rebound.   Musculoskeletal:         General: Normal range of motion.      Cervical back: Normal range of motion and neck supple.      Right lower leg: No edema.      Left lower leg: No edema.   Skin:     General: Skin is warm and dry.      Capillary Refill: Capillary refill takes less than 2 seconds.      Coloration: Skin is not jaundiced or pale.      Findings: No bruising, erythema, lesion or rash.   Neurological:      General: No focal deficit present.      Mental Status: He is alert and oriented to person, place, and time. Mental status is at  "baseline.      Cranial Nerves: No cranial nerve deficit.      Sensory: No sensory deficit.      Motor: No weakness.      Coordination: Coordination normal.      Deep Tendon Reflexes: Reflexes normal.   Psychiatric:         Mood and Affect: Mood normal.         Behavior: Behavior normal.          Temp:  [36.5 °C (97.7 °F)-37.2 °C (99 °F)] 37.2 °C (99 °F)  Heart Rate:  [30-88] 80  Resp:  [12-26] 22  BP: (101-204)/() 139/84      Intake/Output Summary (Last 24 hours) at 1/24/2024 1341  Last data filed at 1/24/2024 1000  Gross per 24 hour   Intake 100 ml   Output 2835 ml   Net -2735 ml       Vitals:    01/24/24 0800   Weight: 74.2 kg (163 lb 9.3 oz)             I/Os    Intake/Output Summary (Last 24 hours) at 1/24/2024 1341  Last data filed at 1/24/2024 1000  Gross per 24 hour   Intake 100 ml   Output 2835 ml   Net -2735 ml       Labs:   Results from last 72 hours   Lab Units 01/24/24  0508 01/23/24  1903   SODIUM mmol/L 139 136   POTASSIUM mmol/L 3.8 4.5   CHLORIDE mmol/L 101 98   CO2 mmol/L 28 30   BUN mg/dL 16 17   CREATININE mg/dL 1.00 1.11   GLUCOSE mg/dL 98 81   CALCIUM mg/dL 9.5 10.5*   ANION GAP mmol/L 14 13   EGFR mL/min/1.73m*2 81 72   PHOSPHORUS mg/dL 3.8  --       Results from last 72 hours   Lab Units 01/24/24  0508 01/23/24  1903   WBC AUTO x10*3/uL 8.4 9.3   HEMOGLOBIN g/dL 15.2 15.5   HEMATOCRIT % 44.1 46.8   PLATELETS AUTO x10*3/uL 283 287   NEUTROS PCT AUTO %  --  69.4   LYMPHS PCT AUTO %  --  17.8   MONOS PCT AUTO %  --  10.3   EOS PCT AUTO %  --  1.6      Lab Results   Component Value Date    CALCIUM 9.5 01/24/2024    PHOS 3.8 01/24/2024      No results found for: \"CRP\"   [unfilled]     Micro/ID:   No results found for the last 90 days.                   No lab exists for component: \"AGALPCRNB\"   .ID  No results found for: \"URINECULTURE\", \"BLOODCULT\", \"CSFCULTSMEAR\"    Images:  ECG 12 lead  Marked sinus bradycardia with AV dissociation and Idioventricular rhythm  Left axis deviation  Right " bundle branch block  Septal infarct , age undetermined  Abnormal ECG  No previous ECGs available       Meds    Scheduled medications  cyclobenzaprine, 5 mg, oral, Nightly  enoxaparin, 40 mg, subcutaneous, q24h  furosemide, 40 mg, intravenous, q12h  lidocaine, 1 patch, transdermal, Daily  tamsulosin, 0.4 mg, oral, Daily      Continuous medications     PRN medications  PRN medications: dextrose 10 % in water (D10W), dextrose, glucagon     Assessment and Plan    Timmy Mayer is a 68 year old male with no significant PMHx other than hx of rheumatic fever admitted to ICU for 3rd degree AV block s/p transvenous   Pacemaker, cf for new CHF      Neuro/Psych  - No active/acute issues      Cardiovascular  # complete heart block sp Transvenous pacing   #elevated trops, likely demand ischemia   #cf new CHF   - Dr. Adan contacted, temporary pacemaker inserted 1/23. Pacing at 80bpm. Strict bed rest until PPM, possibly will be done 1/24 afternoon   - Cardiology consulted, appreciate recs   -TTE pending   -lasix 40 BID   -1500cc fluid restriction, 2g Na diet   -Strict I&O, daily weights     Pulmonary  - No active/acute issues   On RA      Gastrointestinal  - No active/acute issues      Renal/Urology  # Bilateral Hydroureteronephrosis, acute urinary retention 2/2 BPH  -stable Scr  -straight cath as needed   -started flomax      Infectious Disease  - No active/acute issues      Endocrinology  - No active/acute issues      Musculoskeletal/Dermatology     #LBP   -lidocaine  patch, tylenol, flexeril      Hematology/Oncology  - No active/acute issues      Fluids: 1500cc restriction   Electrolytes: replete as needed  Nutrition: Cardiac, 2g Na   GI PPx: None   DVT PPx: Lovenox      Oxygen: RA  Access: PIV  Drips: None   Antibiotics: None      Dispo: Requires ICU level care for 3rd degree AV block, pending PPM placement     Philip Morrissey, DO

## 2024-01-24 NOTE — NURSING NOTE
Pt had approximately 1 min run of vtach on monitor.  HR as high as 109.  Pt asymptomatic.  Pt stated he had been moving his right leg when incident happened.  Pt educated to keep leg still and pt verbalized understanding.  Site assessment to right groin site unchanged from previous.  Resident notified, will monitor and reach out to cardiology if persists.

## 2024-01-24 NOTE — CONSULTS
Consults  History Of Present Illness:    Timmy Mayer is a 68 y.o. male presenting with complete heart block.  He has been short of breath for over 6 months, started after a bout of COVID, did not seek medical attention at that time.  Initially exertional shortness of breath and more recently short of breath even when he lays down at night.    He has not seen a physician in many years.     Last Recorded Vitals:  Vitals:    01/24/24 0700 01/24/24 0800 01/24/24 0900 01/24/24 1000   BP: (!) 132/93 (!) 127/102  139/84   Pulse: 80 80 80 80   Resp: 12 16 17 22   Temp:       TempSrc:       SpO2: 95% 96% 93% 96%   Weight:  74.2 kg (163 lb 9.3 oz)     Height:           Last Labs:  CBC - 1/24/2024:  5:08 AM  8.4 15.2 283    44.1      CMP - 1/24/2024:  5:08 AM  9.5 8.5 32 --- 0.5   3.8 3.9 35 94      PTT - 1/23/2024:  7:03 PM  1.0   11.7 29     Troponin I, High Sensitivity   Date/Time Value Ref Range Status   01/23/2024 08:11 PM 30 (H) 0 - 20 ng/L Final   01/23/2024 07:03 PM 28 (H) 0 - 20 ng/L Final     BNP   Date/Time Value Ref Range Status   01/23/2024 07:03  (H) 0 - 99 pg/mL Final     Hemoglobin A1C   Date/Time Value Ref Range Status   01/23/2024 07:03 PM 5.8 (H) see below % Final     LDL Calculated   Date/Time Value Ref Range Status   01/23/2024 08:11 PM 74 <=99 mg/dL Final     Comment:                                 Near   Borderline      AGE      Desirable  Optimal    High     High     Very High     0-19 Y     0 - 109     ---    110-129   >/= 130     ----    20-24 Y     0 - 119     ---    120-159   >/= 160     ----      >24 Y     0 -  99   100-129  130-159   160-189     >/=190       VLDL   Date/Time Value Ref Range Status   01/23/2024 08:11 PM 18 0 - 40 mg/dL Final      Last I/O:  I/O last 3 completed shifts:  In: - (0 mL/kg)   Out: 1535 (20.7 mL/kg) [Urine:1525 (0.6 mL/kg/hr); Blood:10]  Weight: 74.2 kg     Past Cardiology Tests (Last 3 Years):  EKG:  ECG 12 lead 01/23/2024 (Preliminary)    Echo:  No results found  Neuroendocrine Surgery Progress Note  Admit Date: 3/3/2022  Hospital Day: 1    S:  Patient doing well overnight  Underwent drainage of abdominal wall abscess w/IR  10Fr drain placed; 275 mL fluid removed   15 mL dark sanguinous output overnight  Gram stain showing GNR  Passing flatus, having BM's      O:  Vitals:   Temp:  [97.9 °F (36.6 °C)-99.5 °F (37.5 °C)]   Pulse:  [59-70]   Resp:  [9-36]   BP: ()/(47-99)   SpO2:  [94 %-99 %]     Diet NPO   Intake/Output Summary (Last 24 hours) at 3/4/2022 0612  Last data filed at 3/4/2022 0528  Gross per 24 hour   Intake 1637.61 ml   Output 900 ml   Net 737.61 ml            Physical Exam:  Gen: NAD, AAOx3  HEENT: EOMI, NCAT  CV: Bradycardic rate, normal rhythm   Resp: Sating well on nasal canula  Abd: soft, mildly distended; drain in place to midline; appropriately tender around drain site  Ext: Moving all extremities       Labs:  Recent Labs     03/02/22  1728 03/03/22  0413 03/03/22  0414 03/04/22  0333   WBC 15.82* 21.70*  --  14.22*   HGB 12.8* 10.0*  --  9.9*   HCT 38.8* 29.4*  --  30.3*    121*  --  81*     --  138 138   K 4.2  --  3.7 3.5     --  107 107   CO2 23  --  22* 19*   BUN 26  --  29 27   CREATININE 1.9*  --  1.7* 1.4   BILITOT 1.3*  --  1.1* 1.0   AST 24  --  23 25   ALT 14  --  8* 8*   ALKPHOS 135  --  93 85   CALCIUM 9.9  --  8.1* 8.4*   ALBUMIN 4.1  --  3.1* 3.1*   PROT 7.9  --  6.1 6.4   MG 1.7  --  1.4* 2.1   PHOS 2.2*  --  3.3 3.3     Scheduled Meds: famotidine (PF), 20 mg, Daily  levothyroxine, 75 mcg, Before breakfast  mupirocin, , BID  piperacillin-tazobactam (ZOSYN) IVPB, 4.5 g, Q8H  pravastatin, 20 mg, QHS  tamsulosin, 0.4 mg, Daily       sodium chloride 0.9% Stopped (03/03/22 2039)    sodium chloride 0.9% 5 mL/hr at 03/04/22 0515    NORepinephrine bitartrate-D5W Stopped (03/03/22 0430)     A/P:  89M with PMH of a fib (On Xarelto), HTN, COPD, prior CVA, T2DM who was admitted for management of mesh-associated anterior  "for this or any previous visit from the past 1095 days.    Ejection Fractions:  No results found for: \"EF\"  Cath:  No results found for this or any previous visit from the past 1095 days.    Stress Test:  No results found for this or any previous visit from the past 1095 days.    Cardiac Imaging:  No results found for this or any previous visit from the past 1095 days.      Past Medical History:  He has no past medical history on file.    Past Surgical History:  He has a past surgical history that includes Cardiac electrophysiology procedure (N/A, 1/23/2024).      Social History:  He reports that he has quit smoking. His smoking use included cigarettes. He has never used smokeless tobacco. He reports that he does not currently use alcohol. He reports current drug use. Drug: Marijuana.    Family History:  No family history on file.     Allergies:  Patient has no known allergies.    Inpatient Medications:  Scheduled medications   Medication Dose Route Frequency    cyclobenzaprine  5 mg oral Nightly    enoxaparin  40 mg subcutaneous q24h    furosemide  40 mg intravenous q12h    lidocaine  1 patch transdermal Daily    tamsulosin  0.4 mg oral Daily     PRN medications   Medication    dextrose 10 % in water (D10W)    dextrose    glucagon     Continuous Medications   Medication Dose Last Rate     Outpatient Medications:  No current outpatient medications    Physical Exam:  GENERAL APPEARANCE: Well developed, well nourished, in no acute distress.  CHEST: Symmetric and non-tender.  INTEGUMENT: Skin warm and dry, without gross excoriationis or lesions.  HEENT: No gross abnormalities of conjunctiva, teeth, gums, oral mucosa  NECK: Supple, no bruit. Thyroid not palpable. Carotid upstrokes normal.  NEURO/PSHCY: Alert and oriented x3; appropriate behavior and responses and responses, grossly normal cerebellar function with normal balance and coordination  LUNGS: Clear to auscultation bilaterally; normal respiratory effort.  " abdominal wall abscess  - Will follow up cultures and speciation  - Continue on IV antibiotics for now  - Will discuss surgical planning with staff    Erin Archer MD  [facility], [facility]             HEART: Regular rate and rhythm.  No murmurs.  JVD none.  There are no rubs, clicks or heaves. PMI nondisplaced.  ABDOMEN: Soft, nontender, no palpable hepatosplenomegaly, no mases, no bruits. Abdominal aorta not noted to be enlarged.  MUSCULOSKELETAL: Ambulatory with normal tandem gait.  EXTREMITIES: Warm with good color, no clubbing or cyanois. There is no edema noted.  PERIPHERAL VASCULAR: Pulses present and equally palpable; 2+ throughout. No femoral bruits.       Assessment/Plan     1.  Complete heart block    TSH normal.  LV function preserved on echocardiogram.    Patient has already eaten today.  Will plan on permanent pacemaker tomorrow afternoon.    Peripheral IV 01/23/24 18 G Distal;Left;Upper;Anterior Arm (Active)   Site Assessment Clean;Dry;Intact 01/24/24 0800   Dressing Status Clean;Dry 01/24/24 0800   Number of days: 1       Peripheral IV 01/23/24 20 G Left;Anterior Forearm (Active)   Site Assessment Clean;Dry;Intact 01/24/24 0800   Dressing Status Clean;Dry 01/24/24 0800   Number of days: 1       Pacer Wires (Active)   Site Assessment Clean;Dry;Intact 01/24/24 0534   Dressing Status Clean;Dry;Intact 01/24/24 0534   Number of days: 1       Venous Sheath Other (Comment) Right Femoral (Active)   Site Assessment Clean;Dry;Intact;Pink 01/24/24 0800   Line Status Intact and in place 01/24/24 0800   Dressing Occlusive 01/24/24 0800   Dressing Status Clean;Dry;Intact 01/24/24 0800   Dressing Intervention Dressing reinforced 01/24/24 0800   Dressing Change Due 01/26/24 01/24/24 0800   Color/Movement/Sensation Capillary refill less than 3 sec;Distal pulses palpable 01/24/24 0800   Number of days: 1       Code Status:  Full Code    I spent 30 minutes in the professional and overall care of this patient.        Johnny Adan MD

## 2024-01-24 NOTE — POST-PROCEDURE NOTE
Physician Transition of Care Summary  Invasive Cardiovascular Lab    Procedure Date: 1/24/2024  Attending:    Inocencio Jarrett - Primary  Resident/Fellow/Other Assistant: Surgeon(s) and Role:    Indications:   Pre-op Diagnosis     * Third degree heart block (CMS/HCC) [I44.2]    Post-procedure diagnosis:   Post-op Diagnosis     * Third degree heart block (CMS/HCC) [I44.2]    Procedure(s):     * PPM dual IMPLANT      Procedure Findings:   See report    Description of the Procedure:   See report    Complications:   See report    Stents/Implants:   Cardiovascular Implants       Pacemaker    Lead, Tendril, Sts Domestic, 52cm - [id number] - Implanted        Inventory item: LEAD, TENDRIL, STS DOMESTIC, 52CM Model/Cat number: 2088TC/52    Serial number: [id number] : [employer]    Lot number: [id number] Device identifier: [id number]    Implant Date: 1/24/2024        As of 1/24/2024       Status: Implanted                      Lead, Tendril, Sts Domestic, 58cm - [id number] - Implanted        Inventory item: LEAD, TENDRIL, STS DOMESTIC, 58CM Model/Cat number: 2088TC/58    Serial number: [id number] : [employer]    Lot number: [id number] Device identifier: [id number]    Implant Date: 1/24/2024        As of 1/24/2024       Status: Implanted                              Anticoagulation/Antiplatelet Plan:   na    Estimated Blood Loss:   * No values recorded between 1/24/2024  3:36 PM and 1/24/2024  4:50 PM *    Anesthesia: Moderate Sedation Anesthesia Staff: No anesthesia staff entered.    Any Specimen(s) Removed:   No specimens collected during this procedure.    Disposition:   icu      Electronically signed by: Carmelo Jarrett MD, 1/24/2024 4:50 PM

## 2024-01-24 NOTE — ED PROVIDER NOTES
CC: Chest Pain (Pt informed me in triage he has been having chest pain, SOB and swollen feet for a few weeks.)     HPI:  68-year-old presents to the emergency department with shortness of breath.  Going on for months but acutely worse.  States he cannot ambulate due to the shortness of breath.    Records Reviewed:  Recent available ED and inpatient notes reviewed in EMR.    PMHx/PSHx:  Per HPI.   - has no past medical history on file.  - has no past surgical history on file.  - has Third degree heart block (CMS/HCC) on their problem list.    Medications:  Reviewed in EMR. See EMR for complete list of medications and doses.    Allergies:  Patient has no known allergies.    Social History:  - Tobacco:  reports that he has quit smoking. His smoking use included cigarettes. He has never used smokeless tobacco.   - Alcohol:  reports that he does not currently use alcohol.   - Illicit Drugs:  reports current drug use. Drug: Marijuana.     ROS:  Per HPI.       ???????????????????????????????????????????????????????????????  Triage Vitals:  T 37 °C (98.6 °F)  HR (!) 30  /72  RR 18  O2 98 % None (Room air)    Physical Exam  ???????????????????????????????????????????????????????????????  GEN: Chronically ill-appearing, no acute distress  HEAD: atraumatic  EYES: PERRL, EOMI  CVS/CHEST: Bradycardic  PULM: End expiratory wheeze bilaterally  GI: soft, epigastric abdominal pain to palpation  EXT: no LE edema, 2+ periph pulses in bilat radial and DP   NEURO: Awake and alert, Strength and sensation is equal in b/l upper and lower extremities  SKIN: warm, dry  PSYCH: AAOx3 answers questions appropriately    EKG:  EKG read by me reviewed by me appears to be a complete heart block at 30 bpm.  Left axis deviation.  No significant ST segment elevation or depression.    Assessment and Plan:  68-year-old male presents emergency department shortness of breath.  On exam he has chest and abdominal pain.  Given the shortness of  breath CT PE obtained and was negative for pulmonary embolism.  He did have some mild fluid on a bedside ultrasound.  Therefore CT of his abdomen obtained which shows some urinary retention and hydronephrosis however no other acute intra-abdominal process.  Spoke to cardiology upon patient's arrival who will graciously place a temporary pacemaker.  Patient admitted to the ICU for complete heart block and further workup and evaluation.  Blood pressure stable and therefore did not require transcutaneous pacing in the emergency department.  Trialed atropine without success.  Troponin downtrending.  Patient given Lasix in the emergency department and will go to the ICU after temporary pacemaker placed.    ED Course:  ED Course as of 01/24/24 0058 Tue Jan 23, 2024   1856 EKG read by me reviewed by me appears consistent with a third-degree heart block.  Ventricular rate of 30 bpm.  No significant ST segment elevation or depression. [HD]      ED Course User Index  [HD] Luna Condon DO         Diagnoses as of 01/24/24 0058   Third degree heart block (CMS/HCC)       Social Determinants Limiting Care:  None identified    Disposition:  admitted    Luna Condon DO      Critical Care    Performed by: Luna Condon DO  Authorized by: Luna Condon DO    Critical care provider statement:     Critical care time (minutes):  45    Critical care time was exclusive of:  Separately billable procedures and treating other patients    Critical care was necessary to treat or prevent imminent or life-threatening deterioration of the following conditions:  Cardiac failure (Third-degree heart block)    Critical care was time spent personally by me on the following activities:  Blood draw for specimens, development of treatment plan with patient or surrogate, discussions with consultants, examination of patient, ordering and review of laboratory studies, ordering and review of radiographic studies, re-evaluation of patient's  condition and review of old charts    I assumed direction of critical care for this patient from another provider in my specialty: no      Care discussed with: admitting provider     ? SmartLinks last updated 1/24/2024 12:58 AM        Luna Condon,   01/24/24 0101

## 2024-01-24 NOTE — H&P
Patient: Timmy Mayer Age: 68 y.o.   Gender: male Room/Bed: 10/10-A     Attending: Jose Enrique Gill MD  Code Status:  Full Code    Chief Complaint   Patient: Timmy Mayer is a 68 y.o. male who presented to the hospital for chest pain, SOB.     HPI   Timmy Mayer is a 68 year old male with no significant PMHx other than hx of rheumatic fever who presented to [facility] ED on 1/23/24 with chest pain, SOB, and bilateral foot edema. Patient states that for the past 6 months, he has been feeling SOB on exertion. Is only able to ambulate short distances before becoming SOB. Has gained about 15 pounds in the past few months and is unable to lie flat at night. Has also been experiencing chest pain for the past few months as well and admits to bilateral foot edema. He has not seen a doctor in about 20 years but states that he has been healthy otherwise. Former tobacco smoker, smokes marijuana daily. He states that his mother had a history of CHF. On arrival to the ICU, patient states that he is feeling much better. Chest pain and SOB improved significantly.     12 Point ROS negative unless otherwise specified above.     ED COURSE:   VS: T 98.6 F, HR 30, RR 18, /72, SpO2 98% on RA    Labs  - CBC: Unremarkable   - CMP: Ca 10.5, T pro 8.5   - Coags: INR 1.0, PT 11.7   - Troponin: 28   - VBG: pH 7.35, pCO2 58   - Covid/Flu/RSV negative     Imaging:  - EKG: Sinus bradycardia rate 30 bpm with 3rd degree AV block   - CT angio chest PE: Mild chronic appearing height loss of T7 and T8 vertebral   bodies.  - CT abd/pel w IV con: Mild bilateral hydroureteronephrosis without focal obstructing lesion identified. Moderate distention of the urinary bladder. Borderline prostatomegaly. Please correlate for urinary retention consider follow-up renal ultrasound to confirm resolution of hydronephrosis. Borderline nonspecific intrahepatic and extrahepatic biliary   ductal prominence.     Interventions: Dr. Adan contacted and patient taken to  "cath lab for temporary pacemaker insertion. Patient was given atropine 0.4mg and Lasix 40mg. Admitted to the ICU for further management.     Past Medical History: Hx rheumatic fever   Past Surgical History: Hernia repair, right testicle removal   Allergies: None   Family History: MI, DM, throat cancer, stomach cancer, CHF   Home Medications: See med rec     Social History:  - Coming from home   - Tobacco: Former smoker, quit 6 years ago   - Alcohol: Denies   - Illicit Drug: Smokes marijuana daily     Code status: Full (confirmed)     ED Course   Vitals - /89   Pulse 80   Temp 37 °C (98.6 °F) (Temporal)   Resp 25   Wt 75 kg (165 lb 5.5 oz)   SpO2 97%     Labs:   Results from last 72 hours   Lab Units 01/23/24  1903   SODIUM mmol/L 136   POTASSIUM mmol/L 4.5   CHLORIDE mmol/L 98   CO2 mmol/L 30   BUN mg/dL 17   CREATININE mg/dL 1.11   GLUCOSE mg/dL 81   CALCIUM mg/dL 10.5*   ANION GAP mmol/L 13   EGFR mL/min/1.73m*2 72      Results from last 72 hours   Lab Units 01/23/24  1903   WBC AUTO x10*3/uL 9.3   HEMOGLOBIN g/dL 15.5   HEMATOCRIT % 46.8   PLATELETS AUTO x10*3/uL 287   NEUTROS PCT AUTO % 69.4   LYMPHS PCT AUTO % 17.8   MONOS PCT AUTO % 10.3   EOS PCT AUTO % 1.6      Lab Results   Component Value Date    CALCIUM 10.5 (H) 01/23/2024      No results found for: \"CRP\"   [unfilled]     Micro/ID:   No results found for the last 90 days.                   No lab exists for component: \"AGALPCRNB\"   .ID  No results found for: \"URINECULTURE\", \"BLOODCULT\", \"CSFCULTSMEAR\"    Imaging:   No orders to display   No results found for this or any previous visit (from the past 4464 hour(s)).  No echocardiogram results found for the past 12 months  CT angio chest for pulmonary embolism    Result Date: 1/23/2024  Interpreted By:  Triston Sosa, STUDY: CT ANGIO CHEST FOR PULMONARY EMBOLISM; CT ABDOMEN PELVIS W IV CONTRAST;  1/23/2024 7:56 pm   INDICATION: Signs/Symptoms:heart block, sob; Signs/Symptoms:abdominal " pain. + FAST   COMPARISON: None.   ACCESSION NUMBER(S): [id number]; [id number]   ORDERING CLINICIAN: SRINIVASA LARSEN   TECHNIQUE: CT of the chest, abdomen, and pelvis was performed. Contiguous axial images were obtained through the chest, abdomen and pelvis.  Coronal and sagittal reconstructions were performed. Chest images were acquired in the pulmonary angiographic phase. MIP/3D reconstructions were performed on a separate workstation and provided for interpretation. Intravenous contrast was administered.   FINDINGS:   CHEST: The lung apices are not completely included in the field of view.   LOWER NECK AND CHEST WALL:  Within normal limits. MEDIASTINUM/SYDNEY:No lymphadenopathy. Esophagus is unremarkable. CARDIOVASCULAR:  Cardiac chamber size within normal limits. No pericardial effusion.  Aortic caliber normal. Normal caliber of main pulmonary artery.No pulmonary emboli. Multivessel coronary atherosclerosis. LUNGS, AIRWAYS, AND PLEURA:  Severe emphysema, apical predominant. No consolidation, pleural effusion, or pneumothorax. Scattered subsegmental atelectasis/scarring. The trachea and central airways are patent. MUSCULOSKELETAL: Mild multilevel spinal degenerative change. Mild chronic appearing height loss of the T7 and T8 vertebral bodies.  The T1 vertebral body is not completely included in the field of view.       ABDOMEN:   LIVER: Normal. BILE DUCTS: Common bile duct measures up to 0.7 cm. There is mild intrahepatic biliary ductal prominence. GALLBLADDER: No calcified stones. No wall thickening. PANCREAS: Within normal limits. SPLEEN: Within normal limits. ADRENALS: Within normal limits. KIDNEYS, URETERS, and BLADDER: There is mild bilateral hydroureteronephrosis. Subcentimeter lower pole renal cortical hypodensities which are too small to characterize, though statistically likely to reflect renal cysts. Ureters are non-dilated. The bladder is moderately distended. REPRODUCTIVE: Borderline prostatomegaly.  VESSELS: Moderate atherosclerosis. No abdominal aortic aneurysm. RETROPERITONEUM and LYMPH NODES: No lymphadenopathy. BOWEL: Stomach is within normal limits. Small bowel is non-dilated. Normal appendix. The large bowel demonstrates several diverticula without inflammation. PERITONEUM: No ascites or free air, no fluid collection. BODY WALL: Fat containing left inguinal hernia. MUSCULOSKELETAL: No acute osseous abnormality or suspicious osseous lesions. Mild bilateral hip osteoarthritis. Diffuse osteopenia.       Chest: 1. No acute cardiopulmonary abnormality. No pulmonary embolism. 2. Severe emphysema. 3. Mild chronic appearing height loss of the T7 and T8 vertebral bodies. 4. The lung apices and T1 vertebral body are not completely included in the field of view. If desired, the patient may return for additional images of this region.   Abdomen/pelvis: 1. Mild bilateral hydroureteronephrosis without focal obstructing lesion identified. Moderate distention of the urinary bladder. Borderline prostatomegaly. Please correlate for urinary retention consider follow-up renal ultrasound to confirm resolution of hydronephrosis. 2. Borderline nonspecific intrahepatic and extrahepatic biliary ductal prominence. Please correlate with LFTs.   Signed by: Triston Sosa 1/23/2024 9:03 PM Dictation workstation:   [id number]    CT abdomen pelvis w IV contrast    Result Date: 1/23/2024  Interpreted By:  Triston Sosa, STUDY: CT ANGIO CHEST FOR PULMONARY EMBOLISM; CT ABDOMEN PELVIS W IV CONTRAST;  1/23/2024 7:56 pm   INDICATION: Signs/Symptoms:heart block, sob; Signs/Symptoms:abdominal pain. + FAST   COMPARISON: None.   ACCESSION NUMBER(S): [id number]; [id number]   ORDERING CLINICIAN: SRINIVASA LARSEN   TECHNIQUE: CT of the chest, abdomen, and pelvis was performed. Contiguous axial images were obtained through the chest, abdomen and pelvis.  Coronal and sagittal reconstructions were performed. Chest images were acquired  in the pulmonary angiographic phase. MIP/3D reconstructions were performed on a separate workstation and provided for interpretation. Intravenous contrast was administered.   FINDINGS:   CHEST: The lung apices are not completely included in the field of view.   LOWER NECK AND CHEST WALL:  Within normal limits. MEDIASTINUM/SYDNEY:No lymphadenopathy. Esophagus is unremarkable. CARDIOVASCULAR:  Cardiac chamber size within normal limits. No pericardial effusion.  Aortic caliber normal. Normal caliber of main pulmonary artery.No pulmonary emboli. Multivessel coronary atherosclerosis. LUNGS, AIRWAYS, AND PLEURA:  Severe emphysema, apical predominant. No consolidation, pleural effusion, or pneumothorax. Scattered subsegmental atelectasis/scarring. The trachea and central airways are patent. MUSCULOSKELETAL: Mild multilevel spinal degenerative change. Mild chronic appearing height loss of the T7 and T8 vertebral bodies.  The T1 vertebral body is not completely included in the field of view.       ABDOMEN:   LIVER: Normal. BILE DUCTS: Common bile duct measures up to 0.7 cm. There is mild intrahepatic biliary ductal prominence. GALLBLADDER: No calcified stones. No wall thickening. PANCREAS: Within normal limits. SPLEEN: Within normal limits. ADRENALS: Within normal limits. KIDNEYS, URETERS, and BLADDER: There is mild bilateral hydroureteronephrosis. Subcentimeter lower pole renal cortical hypodensities which are too small to characterize, though statistically likely to reflect renal cysts. Ureters are non-dilated. The bladder is moderately distended. REPRODUCTIVE: Borderline prostatomegaly. VESSELS: Moderate atherosclerosis. No abdominal aortic aneurysm. RETROPERITONEUM and LYMPH NODES: No lymphadenopathy. BOWEL: Stomach is within normal limits. Small bowel is non-dilated. Normal appendix. The large bowel demonstrates several diverticula without inflammation. PERITONEUM: No ascites or free air, no fluid collection. BODY WALL:  Fat containing left inguinal hernia. MUSCULOSKELETAL: No acute osseous abnormality or suspicious osseous lesions. Mild bilateral hip osteoarthritis. Diffuse osteopenia.       Chest: 1. No acute cardiopulmonary abnormality. No pulmonary embolism. 2. Severe emphysema. 3. Mild chronic appearing height loss of the T7 and T8 vertebral bodies. 4. The lung apices and T1 vertebral body are not completely included in the field of view. If desired, the patient may return for additional images of this region.   Abdomen/pelvis: 1. Mild bilateral hydroureteronephrosis without focal obstructing lesion identified. Moderate distention of the urinary bladder. Borderline prostatomegaly. Please correlate for urinary retention consider follow-up renal ultrasound to confirm resolution of hydronephrosis. 2. Borderline nonspecific intrahepatic and extrahepatic biliary ductal prominence. Please correlate with LFTs.   Signed by: Triston Sosa 1/23/2024 9:03 PM Dictation workstation:   [id number]      Interventions:  Medications   perflutren lipid microspheres (Definity) injection 0.5-10 mL of dilution (has no administration in time range)   sulfur hexafluoride microsphr (Lumason) injection 24.28 mg (has no administration in time range)   perflutren protein A microsphere (Optison) injection 0.5 mL (has no administration in time range)   furosemide (Lasix) injection 40 mg (has no administration in time range)   dextrose 50 % injection 25 g (has no administration in time range)   glucagon (Glucagen) injection 1 mg (has no administration in time range)   dextrose 10 % in water (D10W) infusion (has no administration in time range)   atropine injection 0.4 mg (0.4 mg intravenous Given 1/23/24 1906)   furosemide (Lasix) injection 40 mg (40 mg intravenous Given 1/23/24 1906)   iohexol (OMNIPaque) 350 mg iodine/mL solution 90 mL (90 mL intravenous Given 1/23/24 1959)     Past Medical History   History reviewed. No pertinent past medical  history.     Surgical History   History reviewed. No pertinent surgical history.    Family History   No family history on file.    Social History     Social History     Socioeconomic History   • Marital status:      Spouse name: Not on file   • Number of children: Not on file   • Years of education: Not on file   • Highest education level: Not on file   Occupational History   • Not on file   Tobacco Use   • Smoking status: Former     Types: Cigarettes   • Smokeless tobacco: Never   Vaping Use   • Vaping Use: Never used   Substance and Sexual Activity   • Alcohol use: Not Currently   • Drug use: Yes     Types: Marijuana   • Sexual activity: Not on file   Other Topics Concern   • Not on file   Social History Narrative   • Not on file     Social Determinants of Health     Financial Resource Strain: Not on file   Food Insecurity: Not on file   Transportation Needs: Not on file   Physical Activity: Not on file   Stress: Not on file   Social Connections: Not on file   Intimate Partner Violence: Not on file   Housing Stability: Not on file       Tobacco Use: Medium Risk (1/23/2024)    Patient History    • Smoking Tobacco Use: Former    • Smokeless Tobacco Use: Never    • Passive Exposure: Not on file        Social History     Substance and Sexual Activity   Alcohol Use Not Currently        Allergies   No Known Allergies     Objective    Physical Exam  Constitutional:       General: He is not in acute distress.     Appearance: Normal appearance.   Cardiovascular:      Rate and Rhythm: Normal rate and regular rhythm.      Heart sounds: Normal heart sounds. No murmur heard.     No friction rub. No gallop.   Pulmonary:      Effort: Pulmonary effort is normal. No respiratory distress.      Breath sounds: Normal breath sounds. No wheezing, rhonchi or rales.   Abdominal:      General: Abdomen is flat. Bowel sounds are normal. There is no distension.      Palpations: Abdomen is soft.      Tenderness: There is no abdominal  "tenderness.   Musculoskeletal:         General: No swelling or tenderness. Normal range of motion.      Comments: Trace pedal edema   Skin:     General: Skin is warm and dry.      Findings: No erythema or rash.   Neurological:      General: No focal deficit present.      Mental Status: He is alert and oriented to person, place, and time. Mental status is at baseline.   Psychiatric:         Mood and Affect: Mood normal.         Behavior: Behavior normal.          Visit Vitals  /89   Pulse 80   Temp 37 °C (98.6 °F) (Temporal)   Resp 25   Ht 1.778 m (5' 10\")   Wt 75 kg (165 lb 5.5 oz)   SpO2 97%   BMI 23.72 kg/m²   Smoking Status Former   BSA 1.92 m²          Intake/Output Summary (Last 24 hours) at 1/24/2024 0014  Last data filed at 1/23/2024 2315  Gross per 24 hour   Intake --   Output 635 ml   Net -635 ml             Meds    Scheduled medications  furosemide, 40 mg, intravenous, q12h  perflutren lipid microspheres, 0.5-10 mL of dilution, intravenous, Once in imaging  perflutren protein A microsphere, 0.5 mL, intravenous, Once in imaging  sulfur hexafluoride microsphr, 2 mL, intravenous, Once in imaging        Continuous medications       PRN medications  PRN medications: dextrose 10 % in water (D10W), dextrose, glucagon     Assessment and Plan    Timmy Mayer is a 68 year old male with no significant PMHx other than hx of rheumatic fever who presented to [facility] ED on 1/23/24 with chest pain, SOB, and bilateral foot edema. Admitted to ICU for 3rd degree AV block s/p temporary pacemaker insertion and new onset CHF.     Neuro/Psych  - No active/acute issues     Cardiovascular  # Third Degree AV Block s/p Pacemaker Insertion   - Initial EKG showed sinus bradycardia rate 30 bpm with 3rd degree AV block   - Troponin 28 -> 30 on admission   - S/p Atropine 0.4mg IV in ED   - Dr. Adan contacted, temporary pacemaker inserted 1/23  - Strict bed rest until PPM   - Cardiology (Dr. Adan) consulted  - ECHO ordered     # " New onset CHF   -  on admission   - S/p Lasix 40mg IV in ED   - Start Lasix 40mg IV BID   - 1500cc fluid restriction, 2g Na diet   - Strict I&O, daily weights   - ECHO ordered   - TSH, lipid panel, HbA1c ordered     Pulmonary  - No active/acute issues     Gastrointestinal  - No active/acute issues     Renal/Urology  # Bilateral Hydroureteronephrosis   - CT abd/pel in ED showed mild bilateral hydroureteronephrosis without obstruction. Moderate distension of bladder. Borderline prostatomegaly.  - Urology consulted, appreciate recs  - Consider follow up renal US     Infectious Disease  - No active/acute issues     Endocrinology  - No active/acute issues     Musculoskeletal/Dermatology   - No active/acute issues     Hematology/Oncology  - No active/acute issues     Fluids: 1500cc restriction   Electrolytes: replete as needed  Nutrition: Cardiac, 2g Na   GI PPx: None   DVT PPx: Lovenox     Oxygen: RA  Access: PIV  Drips: None   Antibiotics: None     Dispo: Requires ICU level care for 3rd degree AV block s/p temporary pacemaker insertion and new onset CHF. eLOS > 48 hrs.     Bev Castillo DO

## 2024-01-24 NOTE — H&P (VIEW-ONLY)
Consults  History Of Present Illness:    Timmy Mayer is a 68 y.o. male presenting with complete heart block.  He has been short of breath for over 6 months, started after a bout of COVID, did not seek medical attention at that time.  Initially exertional shortness of breath and more recently short of breath even when he lays down at night.    He has not seen a physician in many years.     Last Recorded Vitals:  Vitals:    01/24/24 0700 01/24/24 0800 01/24/24 0900 01/24/24 1000   BP: (!) 132/93 (!) 127/102  139/84   Pulse: 80 80 80 80   Resp: 12 16 17 22   Temp:       TempSrc:       SpO2: 95% 96% 93% 96%   Weight:  74.2 kg (163 lb 9.3 oz)     Height:           Last Labs:  CBC - 1/24/2024:  5:08 AM  8.4 15.2 283    44.1      CMP - 1/24/2024:  5:08 AM  9.5 8.5 32 --- 0.5   3.8 3.9 35 94      PTT - 1/23/2024:  7:03 PM  1.0   11.7 29     Troponin I, High Sensitivity   Date/Time Value Ref Range Status   01/23/2024 08:11 PM 30 (H) 0 - 20 ng/L Final   01/23/2024 07:03 PM 28 (H) 0 - 20 ng/L Final     BNP   Date/Time Value Ref Range Status   01/23/2024 07:03  (H) 0 - 99 pg/mL Final     Hemoglobin A1C   Date/Time Value Ref Range Status   01/23/2024 07:03 PM 5.8 (H) see below % Final     LDL Calculated   Date/Time Value Ref Range Status   01/23/2024 08:11 PM 74 <=99 mg/dL Final     Comment:                                 Near   Borderline      AGE      Desirable  Optimal    High     High     Very High     0-19 Y     0 - 109     ---    110-129   >/= 130     ----    20-24 Y     0 - 119     ---    120-159   >/= 160     ----      >24 Y     0 -  99   100-129  130-159   160-189     >/=190       VLDL   Date/Time Value Ref Range Status   01/23/2024 08:11 PM 18 0 - 40 mg/dL Final      Last I/O:  I/O last 3 completed shifts:  In: - (0 mL/kg)   Out: 1535 (20.7 mL/kg) [Urine:1525 (0.6 mL/kg/hr); Blood:10]  Weight: 74.2 kg     Past Cardiology Tests (Last 3 Years):  EKG:  ECG 12 lead 01/23/2024 (Preliminary)    Echo:  No results found  "for this or any previous visit from the past 1095 days.    Ejection Fractions:  No results found for: \"EF\"  Cath:  No results found for this or any previous visit from the past 1095 days.    Stress Test:  No results found for this or any previous visit from the past 1095 days.    Cardiac Imaging:  No results found for this or any previous visit from the past 1095 days.      Past Medical History:  He has no past medical history on file.    Past Surgical History:  He has a past surgical history that includes Cardiac electrophysiology procedure (N/A, 1/23/2024).      Social History:  He reports that he has quit smoking. His smoking use included cigarettes. He has never used smokeless tobacco. He reports that he does not currently use alcohol. He reports current drug use. Drug: Marijuana.    Family History:  No family history on file.     Allergies:  Patient has no known allergies.    Inpatient Medications:  Scheduled medications   Medication Dose Route Frequency    cyclobenzaprine  5 mg oral Nightly    enoxaparin  40 mg subcutaneous q24h    furosemide  40 mg intravenous q12h    lidocaine  1 patch transdermal Daily    tamsulosin  0.4 mg oral Daily     PRN medications   Medication    dextrose 10 % in water (D10W)    dextrose    glucagon     Continuous Medications   Medication Dose Last Rate     Outpatient Medications:  No current outpatient medications    Physical Exam:  GENERAL APPEARANCE: Well developed, well nourished, in no acute distress.  CHEST: Symmetric and non-tender.  INTEGUMENT: Skin warm and dry, without gross excoriationis or lesions.  HEENT: No gross abnormalities of conjunctiva, teeth, gums, oral mucosa  NECK: Supple, no bruit. Thyroid not palpable. Carotid upstrokes normal.  NEURO/PSHCY: Alert and oriented x3; appropriate behavior and responses and responses, grossly normal cerebellar function with normal balance and coordination  LUNGS: Clear to auscultation bilaterally; normal respiratory effort.  "   HEART: Regular rate and rhythm.  No murmurs.  JVD none.  There are no rubs, clicks or heaves. PMI nondisplaced.  ABDOMEN: Soft, nontender, no palpable hepatosplenomegaly, no mases, no bruits. Abdominal aorta not noted to be enlarged.  MUSCULOSKELETAL: Ambulatory with normal tandem gait.  EXTREMITIES: Warm with good color, no clubbing or cyanois. There is no edema noted.  PERIPHERAL VASCULAR: Pulses present and equally palpable; 2+ throughout. No femoral bruits.       Assessment/Plan     1.  Complete heart block    TSH normal.  LV function preserved on echocardiogram.    Patient has already eaten today.  Will plan on permanent pacemaker tomorrow afternoon.    Peripheral IV 01/23/24 18 G Distal;Left;Upper;Anterior Arm (Active)   Site Assessment Clean;Dry;Intact 01/24/24 0800   Dressing Status Clean;Dry 01/24/24 0800   Number of days: 1       Peripheral IV 01/23/24 20 G Left;Anterior Forearm (Active)   Site Assessment Clean;Dry;Intact 01/24/24 0800   Dressing Status Clean;Dry 01/24/24 0800   Number of days: 1       Pacer Wires (Active)   Site Assessment Clean;Dry;Intact 01/24/24 0534   Dressing Status Clean;Dry;Intact 01/24/24 0534   Number of days: 1       Venous Sheath Other (Comment) Right Femoral (Active)   Site Assessment Clean;Dry;Intact;Pink 01/24/24 0800   Line Status Intact and in place 01/24/24 0800   Dressing Occlusive 01/24/24 0800   Dressing Status Clean;Dry;Intact 01/24/24 0800   Dressing Intervention Dressing reinforced 01/24/24 0800   Dressing Change Due 01/26/24 01/24/24 0800   Color/Movement/Sensation Capillary refill less than 3 sec;Distal pulses palpable 01/24/24 0800   Number of days: 1       Code Status:  Full Code    I spent 30 minutes in the professional and overall care of this patient.        Johnny Adan MD

## 2024-01-24 NOTE — PROGRESS NOTES
01/24/24 1444   Discharge Planning   Living Arrangements Spouse/significant other   Support Systems Spouse/significant other;Family members;Friends/neighbors   Assistance Needed Patient is A&O X3, on room air at baseline, is independent with ADLs and uses no DME at home. Patient denies further needs upon discharge.   Type of Residence Private residence   Number of Stairs to Enter Residence 2   Number of Stairs Within Residence 0   Who is requesting discharge planning? Provider   Home or Post Acute Services None   Patient expects to be discharged to: Home no needs   Does the patient need discharge transport arranged? No   Financial Resource Strain   How hard is it for you to pay for the very basics like food, housing, medical care, and heating? Not hard   Housing Stability   In the last 12 months, was there a time when you were not able to pay the mortgage or rent on time? N   In the last 12 months, how many places have you lived? 1   In the last 12 months, was there a time when you did not have a steady place to sleep or slept in a shelter (including now)? N   Transportation Needs   In the past 12 months, has lack of transportation kept you from medical appointments or from getting medications? no   In the past 12 months, has lack of transportation kept you from meetings, work, or from getting things needed for daily living? No   Patient Choice   Provider Choice list and CMS website (https://medicare.gov/care-compare#search) for post-acute Quality and Resource Measure Data were provided and reviewed with: Patient   Patient / Family choosing to utilize agency / facility established prior to hospitalization No

## 2024-01-25 ENCOUNTER — APPOINTMENT (OUTPATIENT)
Dept: CARDIOLOGY | Facility: HOSPITAL | Age: 69
DRG: 243 | End: 2024-01-25
Payer: MEDICARE

## 2024-01-25 ENCOUNTER — APPOINTMENT (OUTPATIENT)
Dept: RADIOLOGY | Facility: HOSPITAL | Age: 69
DRG: 243 | End: 2024-01-25
Payer: MEDICARE

## 2024-01-25 VITALS
RESPIRATION RATE: 31 BRPM | SYSTOLIC BLOOD PRESSURE: 138 MMHG | HEIGHT: 70 IN | HEART RATE: 76 BPM | BODY MASS INDEX: 21.53 KG/M2 | TEMPERATURE: 97.3 F | WEIGHT: 150.35 LBS | OXYGEN SATURATION: 92 % | DIASTOLIC BLOOD PRESSURE: 86 MMHG

## 2024-01-25 PROBLEM — I44.2 THIRD DEGREE HEART BLOCK (MULTI): Status: RESOLVED | Noted: 2024-01-23 | Resolved: 2024-01-25

## 2024-01-25 LAB
ALBUMIN SERPL BCP-MCNC: 3.7 G/DL (ref 3.4–5)
ANION GAP SERPL CALC-SCNC: 14 MMOL/L (ref 10–20)
BUN SERPL-MCNC: 22 MG/DL (ref 6–23)
CALCIUM SERPL-MCNC: 9.1 MG/DL (ref 8.6–10.3)
CHLORIDE SERPL-SCNC: 97 MMOL/L (ref 98–107)
CO2 SERPL-SCNC: 31 MMOL/L (ref 21–32)
CREAT SERPL-MCNC: 1.35 MG/DL (ref 0.5–1.3)
EGFRCR SERPLBLD CKD-EPI 2021: 57 ML/MIN/1.73M*2
ERYTHROCYTE [DISTWIDTH] IN BLOOD BY AUTOMATED COUNT: 13.2 % (ref 11.5–14.5)
GLUCOSE SERPL-MCNC: 94 MG/DL (ref 74–99)
HCT VFR BLD AUTO: 44.2 % (ref 41–52)
HGB BLD-MCNC: 14.7 G/DL (ref 13.5–17.5)
MAGNESIUM SERPL-MCNC: 1.76 MG/DL (ref 1.6–2.4)
MCH RBC QN AUTO: 30.8 PG (ref 26–34)
MCHC RBC AUTO-ENTMCNC: 33.3 G/DL (ref 32–36)
MCV RBC AUTO: 93 FL (ref 80–100)
NRBC BLD-RTO: 0 /100 WBCS (ref 0–0)
PHOSPHATE SERPL-MCNC: 4.7 MG/DL (ref 2.5–4.9)
PLATELET # BLD AUTO: 252 X10*3/UL (ref 150–450)
POTASSIUM SERPL-SCNC: 3.6 MMOL/L (ref 3.5–5.3)
RBC # BLD AUTO: 4.77 X10*6/UL (ref 4.5–5.9)
SODIUM SERPL-SCNC: 138 MMOL/L (ref 136–145)
WBC # BLD AUTO: 9.2 X10*3/UL (ref 4.4–11.3)

## 2024-01-25 PROCEDURE — 93010 ELECTROCARDIOGRAM REPORT: CPT | Performed by: INTERNAL MEDICINE

## 2024-01-25 PROCEDURE — 83735 ASSAY OF MAGNESIUM: CPT

## 2024-01-25 PROCEDURE — 36415 COLL VENOUS BLD VENIPUNCTURE: CPT

## 2024-01-25 PROCEDURE — 93005 ELECTROCARDIOGRAM TRACING: CPT

## 2024-01-25 PROCEDURE — 71046 X-RAY EXAM CHEST 2 VIEWS: CPT | Performed by: RADIOLOGY

## 2024-01-25 PROCEDURE — 80069 RENAL FUNCTION PANEL: CPT

## 2024-01-25 PROCEDURE — 2500000004 HC RX 250 GENERAL PHARMACY W/ HCPCS (ALT 636 FOR OP/ED): Mod: MUE

## 2024-01-25 PROCEDURE — 71046 X-RAY EXAM CHEST 2 VIEWS: CPT

## 2024-01-25 PROCEDURE — 99232 SBSQ HOSP IP/OBS MODERATE 35: CPT | Performed by: PHYSICIAN ASSISTANT

## 2024-01-25 PROCEDURE — 99239 HOSP IP/OBS DSCHRG MGMT >30: CPT

## 2024-01-25 PROCEDURE — 85027 COMPLETE CBC AUTOMATED: CPT

## 2024-01-25 PROCEDURE — 2500000005 HC RX 250 GENERAL PHARMACY W/O HCPCS

## 2024-01-25 RX ORDER — CEPHALEXIN 500 MG/1
500 CAPSULE ORAL 4 TIMES DAILY
Qty: 28 CAPSULE | Refills: 0 | Status: SHIPPED | OUTPATIENT
Start: 2024-01-25 | End: 2024-02-01

## 2024-01-25 RX ORDER — TAMSULOSIN HYDROCHLORIDE 0.4 MG/1
0.4 CAPSULE ORAL DAILY
Qty: 30 CAPSULE | Refills: 0 | Status: SHIPPED | OUTPATIENT
Start: 2024-01-26 | End: 2024-02-28 | Stop reason: WASHOUT

## 2024-01-25 RX ADMIN — ENOXAPARIN SODIUM 40 MG: 40 INJECTION SUBCUTANEOUS at 08:48

## 2024-01-25 RX ADMIN — TAMSULOSIN HYDROCHLORIDE 0.4 MG: 0.4 CAPSULE ORAL at 08:23

## 2024-01-25 RX ADMIN — LIDOCAINE 1 PATCH: 4 PATCH TOPICAL at 08:23

## 2024-01-25 ASSESSMENT — PAIN SCALES - GENERAL
PAINLEVEL_OUTOF10: 0 - NO PAIN

## 2024-01-25 ASSESSMENT — PAIN - FUNCTIONAL ASSESSMENT
PAIN_FUNCTIONAL_ASSESSMENT: 0-10

## 2024-01-25 NOTE — DISCHARGE INSTRUCTIONS
Homegoing Instructions After a Pacemaker Implant    Incision:  Leave the surgical dressing in place for 5 days post implant, then you may remove the dressing.  Please keep your incision dry and open to air for one week after implant.  Follow your doctor's instructions about removal of the paper steri strips that may cover your incision.  After one week, you may wash the site with your usual soap and water.   Inspect your incision each day.  If you note increased redness, swelling, or drainage, or if you develop a fever, please call your pacemaker doctor immediately.     Pain:  It is normal for the area around the incision to be tender for a few weeks following surgery.  Pain relievers such as Tylenol or Motrin are usually sufficient for pain relief.  The pain should get better each day, it it gets worse, please contact your pacemaker doctor.    Activity:  First four weeks after implant:  Avoid gross arm movement on the side of your new implant.  Do not raise or stretch your arm above shoulder level.  Do not  weights greater than 15 lbs.  Avoid activities such as golf or swimming for six weeks.  Try to use garments that button down the front.   Avoid pull over shirts.     ID Card:  It is important that you carry your pacemaker ID card with you at all times.  Inform all doctors and healthcare providers that you have a pacemaker.  Until the lead wires are fully healed in your heart, a prophylactic antibiotic may need to be given to you prior to procedures such as tooth extraction or deep cleaning.      Electromagnetic Interference:  Microwave ovens are safe to use.  Cellular telephones should be held to the ear that is opposite your pacemaker.  Please inform any physicians of your pacemaker implant prior to MRI.  Some pacemakers are MRI compatible and some are not.  You should be prepared to show your pacemaker identification card to the security guards at metal detectors,  hand wand detector should be kept away  from the pacemaker.  Read the patient booklet for more information.  Please keep your pacemaker doctor informed about changes in your incision or how you are feeling.    It is your responsibilitiy to make and keep appointments.   After each visit on your way out, make an appointment for your next visit.  Please follow the recommendations of your doctor for the frequency of appointments.  Your good health is your personal responsibility.

## 2024-01-25 NOTE — PROGRESS NOTES
Patient: Timmy Mayer Age: 68 y.o.   Gender: male Room/bed: 10/10-A     Attending: Yassine Goddard, DO  Code Status:  Full Code    Overnight Events     PPM placed, no complications     Subjective   Doing well today, on RA. Improved LE edema and dyspnea. No chest pain, palpitations.      Objective    Physical Exam  Constitutional:       General: He is not in acute distress.     Appearance: Normal appearance.   HENT:      Head: Normocephalic and atraumatic.      Right Ear: Tympanic membrane, ear canal and external ear normal.      Left Ear: Tympanic membrane, ear canal and external ear normal.      Nose: Nose normal.      Mouth/Throat:      Mouth: Mucous membranes are moist.      Pharynx: Oropharynx is clear.   Eyes:      General: No scleral icterus.     Extraocular Movements: Extraocular movements intact.      Conjunctiva/sclera: Conjunctivae normal.      Pupils: Pupils are equal, round, and reactive to light.   Cardiovascular:      Rate and Rhythm: Normal rate and regular rhythm.      Pulses: Normal pulses.      Heart sounds: Normal heart sounds. No murmur heard.     No gallop.   Pulmonary:      Effort: Pulmonary effort is normal.      Breath sounds: Normal breath sounds.   Abdominal:      General: Abdomen is flat. Bowel sounds are normal.      Palpations: Abdomen is soft.      Tenderness: There is no abdominal tenderness. There is no guarding or rebound.   Musculoskeletal:         General: Normal range of motion.      Cervical back: Normal range of motion and neck supple.      Right lower leg: No edema.      Left lower leg: No edema.   Skin:     General: Skin is warm and dry.      Capillary Refill: Capillary refill takes less than 2 seconds.      Coloration: Skin is not jaundiced or pale.      Findings: No bruising, erythema, lesion or rash.   Neurological:      General: No focal deficit present.      Mental Status: He is alert and oriented to person, place, and time. Mental status is at baseline.      Cranial Nerves:  "No cranial nerve deficit.      Sensory: No sensory deficit.      Motor: No weakness.      Coordination: Coordination normal.      Deep Tendon Reflexes: Reflexes normal.   Psychiatric:         Mood and Affect: Mood normal.         Behavior: Behavior normal.          Temp:  [36.4 °C (97.5 °F)-37.2 °C (99 °F)] 36.4 °C (97.5 °F)  Heart Rate:  [] 73  Resp:  [16-29] 19  BP: (102-154)/() 141/80      Intake/Output Summary (Last 24 hours) at 1/25/2024 1304  Last data filed at 1/25/2024 0800  Gross per 24 hour   Intake 730.02 ml   Output 1405 ml   Net -674.98 ml       Vitals:    01/25/24 1009   Weight: 68.2 kg (150 lb 5.7 oz)             I/Os    Intake/Output Summary (Last 24 hours) at 1/25/2024 1304  Last data filed at 1/25/2024 0800  Gross per 24 hour   Intake 730.02 ml   Output 1405 ml   Net -674.98 ml       Labs:   Results from last 72 hours   Lab Units 01/25/24  0502 01/24/24  0508 01/23/24  1903   SODIUM mmol/L 138 139 136   POTASSIUM mmol/L 3.6 3.8 4.5   CHLORIDE mmol/L 97* 101 98   CO2 mmol/L 31 28 30   BUN mg/dL 22 16 17   CREATININE mg/dL 1.35* 1.00 1.11   GLUCOSE mg/dL 94 98 81   CALCIUM mg/dL 9.1 9.5 10.5*   ANION GAP mmol/L 14 14 13   EGFR mL/min/1.73m*2 57* 81 72   PHOSPHORUS mg/dL 4.7 3.8  --       Results from last 72 hours   Lab Units 01/25/24  0502 01/24/24  0508 01/23/24  1903   WBC AUTO x10*3/uL 9.2 8.4 9.3   HEMOGLOBIN g/dL 14.7 15.2 15.5   HEMATOCRIT % 44.2 44.1 46.8   PLATELETS AUTO x10*3/uL 252 283 287   NEUTROS PCT AUTO %  --   --  69.4   LYMPHS PCT AUTO %  --   --  17.8   MONOS PCT AUTO %  --   --  10.3   EOS PCT AUTO %  --   --  1.6      Lab Results   Component Value Date    CALCIUM 9.1 01/25/2024    PHOS 4.7 01/25/2024      No results found for: \"CRP\"   [unfilled]     Micro/ID:   No results found for the last 90 days.                   No lab exists for component: \"AGALPCRNB\"   .ID  No results found for: \"URINECULTURE\", \"BLOODCULT\", \"CSFCULTSMEAR\"    Images:  ECG 12 " Lead  Atrial-sensed ventricular-paced rhythm with frequent Premature ventricular complexes  Abnormal ECG  When compared with ECG of 23-JAN-2024 18:35, (unconfirmed)  Electronic ventricular pacemaker has replaced Idioventricular rhythm  Vent. rate has increased BY  93 BPM  ECG 12 lead tomorrow at 8 AM  Normal sinus rhythm  Nonspecific intraventricular block  Possible Lateral infarct (cited on or before 24-JAN-2024)  Abnormal ECG  When compared with ECG of 24-JAN-2024 20:59, (unconfirmed)  Questionable change in initial forces of Lateral leads  ECG 12 lead  Normal sinus rhythm  Nonspecific intraventricular block  Lateral infarct , age undetermined  Abnormal ECG  When compared with ECG of 24-JAN-2024 19:41, (unconfirmed)  Sinus rhythm has replaced Electronic ventricular pacemaker  Vent. rate has decreased BY  47 BPM  XR chest 2 views  Narrative: Interpreted By:  Cb Hermosillo,   STUDY:  XR CHEST 2 VIEWS;  1/25/2024 9:27 am      INDICATION:  Signs/Symptoms:postpacer.      COMPARISON:  Correlation with CT scan abdomen and pelvis from 01/23/2024.  Comparison with chest x-ray from 01/24/2024..      ACCESSION NUMBER(S):  [id number]      ORDERING CLINICIAN:  FIDELIA GUTIERREZ      TECHNIQUE:  PA and lateral views of the chest were obtained.      FINDINGS:  MEDIASTINUM/LUNGS/SYDNEY:  No cardiomegaly, vascular congestion, or pleural effusion. Cardiac  pacemaker on.  Hyperinflation of the lungs. No abnormal opacity in  either lung worrisome for tumor or pneumonia. No pneumothorax.  No tracheal deviation.  No abnormal hilar fullness or gross mass on either side.      BONES:  No lytic or blastic destructive bone lesion.      UPPER ABDOMEN:  Grossly intact.      Impression: Stable cardiac pacemaker on the left. No pneumothorax.      Nonspecific hyperinflation of the lungs. Clinical correlation needed  to exclude underlying COPD.      Signed by: Cb Hermosillo 1/25/2024 9:51 AM  Dictation workstation:    [id number]  Electrophysiology procedure  Recommendations:  1.  Post procedure antibiotics, cxr ecg postop antibiotics     2.  Site care instructions        Meds    Scheduled medications  enoxaparin, 40 mg, subcutaneous, q24h  lidocaine, 1 patch, transdermal, Daily  tamsulosin, 0.4 mg, oral, Daily      Continuous medications     PRN medications  PRN medications: dextrose 10 % in water (D10W), dextrose, glucagon     Assessment and Plan    Timmy Mayer is a 68 year old male with no significant PMHx other than hx of rheumatic fever admitted to ICU for 3rd degree AV block s/p PPM     Neuro/Psych  - No active/acute issues      Cardiovascular  # complete heart block sp PPM cb ADHF   #elevated trops, likely demand ischemia    - temporary pacemaker inserted 1/23. PPM placed 1/24   -TTE with normal EF   -lasix held due to RONALDO, now euvolemic   -1500cc fluid restriction, 2g Na diet, Strict I&O, daily weights  - Cardiology following, awaiting dc recs       Pulmonary  - No active/acute issues   On RA      Gastrointestinal  - No active/acute issues      Renal/Urology  # Bilateral Hydroureteronephrosis, acute urinary retention 2/2 BPH  -stable Scr  -Flomax started with resolution of retention      Infectious Disease  - No active/acute issues      Endocrinology  - No active/acute issues      Musculoskeletal/Dermatology      #LBP   -lidocaine  patch, tylenol     Hematology/Oncology  - No active/acute issues      Fluids: 1500cc restriction   Electrolytes: replete as needed  Nutrition: Cardiac, 2g Na   GI PPx: None   DVT PPx: Lovenox      Oxygen: RA  Access: PIV  Drips: None   Antibiotics: None      Dispo: pending transfer to the floor         Philip Morrissey DO

## 2024-01-25 NOTE — PROGRESS NOTES
Subjective Data:  Patient doing well. Denies any chest pain, chest pressure, palpitations, dizziness, cough, shortness of breath, orthopnea, edema.  Has been up ambulating without dizziness, shortness of breath or near syncope.     Overnight Events:    No acute events overnight.      Objective Data:  Last Recorded Vitals:  Vitals:    01/25/24 0300 01/25/24 0400 01/25/24 0500 01/25/24 0600   BP: 132/71 154/74 102/65 141/80   Pulse: 67 65 66 73   Resp: (!) 28 16 19 19   Temp:   36.4 °C (97.5 °F)    TempSrc:       SpO2: 91% 91% 92% 92%   Weight:   68.2 kg (150 lb 5.7 oz) 68.2 kg (150 lb 5.7 oz)   Height:           Last Labs:  CBC - 1/25/2024:  5:02 AM  9.2 14.7 252    44.2      CMP - 1/25/2024:  5:02 AM  9.1 8.5 32 --- 0.5   4.7 3.7 35 94      PTT - 1/23/2024:  7:03 PM  1.0   11.7 29     TROPHS   Date/Time Value Ref Range Status   01/23/2024 08:11 PM 30 0 - 20 ng/L Final   01/23/2024 07:03 PM 28 0 - 20 ng/L Final     BNP   Date/Time Value Ref Range Status   01/23/2024 07:03  0 - 99 pg/mL Final     HGBA1C   Date/Time Value Ref Range Status   01/23/2024 07:03 PM 5.8 see below % Final     LDLCALC   Date/Time Value Ref Range Status   01/23/2024 08:11 PM 74 <=99 mg/dL Final     Comment:                                 Near   Borderline      AGE      Desirable  Optimal    High     High     Very High     0-19 Y     0 - 109     ---    110-129   >/= 130     ----    20-24 Y     0 - 119     ---    120-159   >/= 160     ----      >24 Y     0 -  99   100-129  130-159   160-189     >/=190       VLDL   Date/Time Value Ref Range Status   01/23/2024 08:11 PM 18 0 - 40 mg/dL Final      Last I/O:  I/O last 3 completed shifts:  In: 930 (13.6 mL/kg) [P.O.:680; I.V.:53 (0.8 mL/kg); IV Piggyback:197]  Out: 4140 (60.7 mL/kg) [Urine:4125 (1.7 mL/kg/hr); Blood:15]  Weight: 68.2 kg     Past Cardiology Tests (Last 3 Years):  EKG:  ECG 12 lead 01/23/2024 (Preliminary)    Echo:  Transthoracic Echo (TTE) Complete 01/24/2024    Ejection  "Fractions:  No results found for: \"EF\"  Cath:  No results found for this or any previous visit from the past 1095 days.    Stress Test:  No results found for this or any previous visit from the past 1095 days.    Cardiac Imaging:  No results found for this or any previous visit from the past 1095 days.      Inpatient Medications:  Scheduled medications   Medication Dose Route Frequency    enoxaparin  40 mg subcutaneous q24h    lidocaine  1 patch transdermal Daily    tamsulosin  0.4 mg oral Daily     PRN medications   Medication    dextrose 10 % in water (D10W)    dextrose    glucagon     Continuous Medications   Medication Dose Last Rate       Physical Exam:  Physical Exam  Constitutional:       Appearance: Normal appearance.   HENT:      Head: Normocephalic.      Nose: Nose normal.      Mouth/Throat:      Mouth: Mucous membranes are moist.   Eyes:      Conjunctiva/sclera: Conjunctivae normal.   Cardiovascular:      Rate and Rhythm: Normal rate and regular rhythm.      Heart sounds: No murmur heard.     No friction rub. No gallop.      Comments: No JVD  Pulmonary:      Effort: Pulmonary effort is normal.      Breath sounds: Normal breath sounds.   Abdominal:      Palpations: Abdomen is soft.   Musculoskeletal:         General: Normal range of motion.      Right lower leg: No edema.      Left lower leg: No edema.   Skin:     General: Skin is warm and dry.   Neurological:      General: No focal deficit present.      Mental Status: He is alert and oriented to person, place, and time. Mental status is at baseline.   Psychiatric:         Mood and Affect: Mood normal.         Behavior: Behavior normal.       Transthoracic Echo (TTE) Complete    Result Date: 1/24/2024   [facility], [address] [address]               Tel [phone] and Fax [phone] TRANSTHORACIC ECHOCARDIOGRAM REPORT  Patient Name:      CAESAR Adams Physician:    [id number] Johnny Adan                       "                                         MD Study Date:        1/24/2024            Ordering Provider:    [id number] NIXON GOLDBERG MRN/PID:           [id number]             Fellow: Accession#:        [id number]         Nurse:                Enma Linares RN Date of Birth/Age: 1955 / 68 years Sonographer:          Bianca De Leon                                                               RDCS Gender:            M                    Additional Staff: Height:            177.80 cm            Admit Date:           1/23/2024 Weight:            73.94 kg             Admission Status:     Inpatient - Time                                                               Critical BSA:               1.91 m2              Encounter#:           [id number]                                         Department Location:  [facility] Non                                                               Invasive Blood Pressure: 139 /84 mmHg Study Type:    TRANSTHORACIC ECHO (TTE) COMPLETE Diagnosis/ICD: Atrioventricular block, complete-I44.2 Indication:    Third degree heart block CPT Code:      Echo Complete w Full Doppler-81985 Patient History: Pertinent         Chest Pain, Dyspnea, H/O Rheumatic Fever, [place]. foot edema and History:          CHF. Study Detail: The following Echo studies were performed: 2D, M-Mode, Doppler and               color flow. Technically challenging study due to body habitus,               patient lying in supine position and prominent lung artifact.               Definity used as a contrast agent for endocardial border               definition. Total contrast used for this procedure was 2.0 mL via               IV push. Unable to obtain LA, RA view. The patient was awake.  PHYSICIAN INTERPRETATION: Left Ventricle: The left ventricular systolic function is normal, with an estimated  ejection fraction of 60%. There are no regional wall motion abnormalities. The left ventricular cavity size is normal. Left ventricular diastolic filling was not assessed. Left Atrium: The left atrium was not well visualized. Right Ventricle: The right ventricle is normal in size. There is normal right ventricular global systolic function. Right Atrium: The right atrium was not well visualized. Aortic Valve: The aortic valve is probably trileaflet. There is no evidence of aortic valve regurgitation. The peak instantaneous gradient of the aortic valve is 2.3 mmHg. The mean gradient of the aortic valve is 1.0 mmHg. Mitral Valve: The mitral valve is normal in structure. There is no evidence of mitral valve regurgitation. Tricuspid Valve: The tricuspid valve is structurally normal. No evidence of tricuspid regurgitation. Pulmonic Valve: The pulmonic valve is not well visualized. The pulmonic valve regurgitation was not well visualized. Pericardium: There is no pericardial effusion noted. Aorta: The aortic root was not well visualized. Systemic Veins: The inferior vena cava appears to be of normal size. There is IVC inspiratory collapse greater than 50%.  CONCLUSIONS:  1. Left ventricular systolic function is normal with a 60% estimated ejection fraction. QUANTITATIVE DATA SUMMARY: 2D MEASUREMENTS:                          Normal Ranges: IVSd:          1.07 cm   (0.6-1.1cm) LVPWd:         0.91 cm   (0.6-1.1cm) LVIDd:         3.28 cm   (3.9-5.9cm) LVIDs:         2.19 cm LV Mass Index: 48.4 g/m2 LV % FS        33.2 % LV DIASTOLIC FUNCTION:                       Normal Ranges: MV Peak E:   0.50 m/s (0.7-1.2 m/s) MV Peak A:   0.60 m/s (0.42-0.7 m/s) E/A Ratio:   0.83     (1.0-2.2) MV medial e' 0.08 m/s MITRAL VALVE:                 Normal Ranges: MV DT: 251 msec (150-240msec) AORTIC VALVE:                                   Normal Ranges: AoV Vmax:                0.76 m/s (<=1.7m/s) AoV Peak P.3 mmHg (<20mmHg)  AoV Mean P.0 mmHg (1.7-11.5mmHg) LVOT Max Rashid:            0.60 m/s (<=1.1m/s) AoV VTI:                 10.20 cm (18-25cm) LVOT VTI:                7.79 cm LVOT Diameter:           2.00 cm  (1.8-2.4cm) AoV Area, VTI:           2.40 cm2 (2.5-5.5cm2) AoV Area,Vmax:           2.49 cm2 (2.5-4.5cm2) AoV Dimensionless Index: 0.76  RIGHT VENTRICLE: RV Basal 2.88 cm RV Mid   1.30 cm RV Major 7.0 cm TRICUSPID VALVE/RVSP:                             Normal Ranges: Peak TR Velocity: 2.70 m/s RV Syst Pressure: 37.2 mmHg (< 30mmHg) IVC Diam:         1.64 cm PULMONIC VALVE:                      Normal Ranges: PV Max Rashid: 0.8 m/s  (0.6-0.9m/s) PV Max P.9 mmHg  [id number] Johnny Adan MD Electronically signed on 2024 at 3:21:45 PM  ** Final **         Assessment/Plan     Timmy Mayer is a 69 yo male who presented with CHB and is now s/p PPM ([facility]). CXR without evidence of pneumonthorax, surgical site w/ dressing c/d/I. Pain free. Ambulating w/o any difficulty.  Device check WNL, no changes made. Patient to follow up with Dr. Rhodes outpatient for ongoing device check/mgmt. Will sign off.     Peripheral IV 24 18 G Distal;Left;Upper;Anterior Arm (Active)   Site Assessment Clean;Dry;Intact 24 08   Dressing Status Clean;Dry 24 08   Number of days: 2       Peripheral IV 24 20 G Left;Anterior Forearm (Active)   Site Assessment Clean;Dry;Intact 24 08   Dressing Status Clean;Dry 24 08   Number of days: 2       Code Status:  Full Code    I spent 30 minutes in the professional and overall care of this patient.        Whit Sharma PA-C

## 2024-01-25 NOTE — HOSPITAL COURSE
Timmy Mayer is a 68 year old male with no significant PMHx admitted to ICU for 3rd degree AV block cb ADHF. Patient had transvenous pacer placed by cardiology on 1/23 and subsequently underwent PPM on 1/24 without complications. PPM was interrogated 1/25 before discharge. ADHF responded with diuresis which was stopped due to development of RONALDO. TTE revealed normal LVEF.  Hospital course was complicated by acute urinary retention in the setting of BPH, CTAP also revealed bilateral hydronephrosis.  Patient was started on Flomax with resolution of urinary retention.   Patient was hemodynamically stable on discharge, satting well on room air.  Patient was discharged on Flomax, no additional cardiac medications were prescribed on discharge.  Patient was instructed to follow-up with cardiology as scheduled as well as PCP within 5 to 7 days. Please repeat RFP in 1 week to check Scr. Patient was understanding of plan and agreed prior to discharge.

## 2024-01-25 NOTE — OP NOTE
PPM dual IMPLANT (L) Operative Note     Date: 2024  OR Location: [facility] Cardiac Cath Lab    Name: Timmy Mayer, : 1955, Age: 68 y.o., MRN: [id number], Sex: male    Diagnosis  Pre-op Diagnosis     * Third degree heart block (CMS/HCC) [I44.2] Post-op Diagnosis     * Third degree heart block (CMS/HCC) [I44.2]     Procedures    * PPM dual IMPLANT    Surgeons      * Carmelo Jarrett - Primary    Resident/Fellow/Other Assistant:  Surgeon(s) and Role:    Procedure Summary  Anesthesia: Moderate Sedation  ASA: ASA status not filed in the log.  Anesthesia Staff: No anesthesia staff entered.  Estimated Blood Loss: 0mL  Intra-op Medications:   Administrations occurring from 1536 to 1734 on 24:   Medication Name Total Dose   vancomycin (Vancocin) in dextrose 5% water 535.69 mL   sodium chloride 0.9% infusion 52.99 mL   oxygen (O2) therapy Cannot be calculated   lidocaine (Xylocaine) 20 mg/mL (2 %) injection 20 mL   midazolam (Versed) injection 2 mg   fentaNYL PF (Sublimaze) injection 50 mcg   iohexol (OMNIPaque) 300 mg iodine/mL solution 30 mL   sodium chloride 0.9 % bolus 197.03 mL   ondansetron (Zofran) injection 4 mg         Intraprocedure I/O Totals       None           Specimen: No specimens collected     Staff:   Circulator: Sherri Shah RN  Scrub Person: Sudarshan Land, RT    Drains and/or Catheters: * None in log *      Implants:  Implants       Type Name Action Serial No.      Cardiac Pacemaker LEAD, TENDRIL, STS DOMESTIC, 52CM - [id number] Implanted [id number]     Cardiac Pacemaker LEAD, TENDRIL, STS DOMESTIC, 58CM - [id number] Implanted [id number]     Cardiac Pacemaker PACEMAKER, GENERATOR, DUAL ASSURITY MRI - [id number] Implanted [id number]            Indications: Timmy Mayer is an 68 y.o. male who is having surgery for Third degree heart block (CMS/HCC) [I44.2].     The patient was seen in the preoperative area. The risks, benefits, complications, treatment options, non-operative alternatives, expected  recovery and outcomes were discussed with the patient. The possibilities of reaction to medication, pulmonary aspiration, injury to surrounding structures, bleeding, recurrent infection, the need for additional procedures, failure to diagnose a condition, and creating a complication requiring transfusion or operation were discussed with the patient. The patient concurred with the proposed plan, giving informed consent.  The site of surgery was properly noted/marked if necessary per policy. The patient has been actively warmed in preoperative area. Preoperative antibiotics have been ordered and given within 1 hours of incision. Venous thrombosis prophylaxis are not indicated.    Procedure Details: Under local anesthesia, subclavian venogram was completed and it disclosed a very small caliber cephalic vein and a large subclavian vein.    Subclavian venipuncture was done and guidewire was positioned in the central circulation.  With a retained guidewire approach atrial and ventricular leads were introduced into the central circulation.  The sheaths were peeled away the leads were secured in right atrial appendage and in right ventricular septum respectively.  Good capture and sensing thresholds were obtained.    P waves were estimated at 3 mV the impedance was 530 ohms and the capture threshold was 0.5 V at 0.4 ms.    The patient was in complete heart block with no escape rhythm, R waves not obtained.  Ventricular lead impedance was 730 ohms and the capture threshold was 0.5 V at 0.4 ms.    The leads were connected to a pulse generator which was positioned in the prepectoral pocket fashioned by blunt dissection.  Hemostasis was achieved the pocket was irrigated with antibiotic solution and closed in layers.    Complications:  None; patient tolerated the procedure well.    Disposition: PACU - hemodynamically stable.  Condition: stable       Attending Attestation: I was present and scrubbed for the entire  procedure.    Carmelo Jarrett  Phone Number: [phone]

## 2024-01-25 NOTE — DISCHARGE SUMMARY
Discharge Diagnosis  Third degree heart block (CMS/HCC)    Issues Requiring Follow-Up  Heart block sp PPM   BPH on flomax   RONALDO     Discharge Meds     Your medication list        START taking these medications        Instructions Last Dose Given Next Dose Due   tamsulosin 0.4 mg 24 hr capsule  Commonly known as: Flomax  Start taking on: January 26, 2024      Take 1 capsule (0.4 mg) by mouth once daily. Do not start before January 26, 2024.                 Where to Get Your Medications        These medications were sent to [facility] [address] [place] [address] [facility]  [address] [facility] [address]      Phone: [phone]   tamsulosin 0.4 mg 24 hr capsule         Test Results Pending At Discharge  Pending Labs       No current pending labs.            Hospital Course  Timmy Mayer is a 68 year old male with no significant PMHx admitted to ICU for 3rd degree AV block cb ADHF. Patient had transvenous pacer placed by cardiology on 1/23 and subsequently underwent PPM on 1/24 without complications. PPM was interrogated 1/25 before discharge. ADHF responded with diuresis which was stopped due to development of RONALDO. TTE revealed normal LVEF.  Hospital course was complicated by acute urinary retention in the setting of BPH, CTAP also revealed bilateral hydronephrosis.  Patient was started on Flomax with resolution of urinary retention.   Patient was hemodynamically stable on discharge, satting well on room air.  Patient was discharged on Flomax, no additional cardiac medications were prescribed on discharge.  Patient was instructed to follow-up with cardiology as scheduled as well as PCP within 5 to 7 days. Please repeat RFP in 1 week to check Scr. Patient was understanding of plan and agreed prior to discharge.       Pertinent Physical Exam At Time of Discharge  Physical Exam  Constitutional:       General: He is not in acute distress.     Appearance: Normal appearance.   HENT:       Head: Normocephalic and atraumatic.      Right Ear: Tympanic membrane, ear canal and external ear normal.      Left Ear: Tympanic membrane, ear canal and external ear normal.      Nose: Nose normal.      Mouth/Throat:      Mouth: Mucous membranes are moist.      Pharynx: Oropharynx is clear.   Eyes:      General: No scleral icterus.     Extraocular Movements: Extraocular movements intact.      Conjunctiva/sclera: Conjunctivae normal.      Pupils: Pupils are equal, round, and reactive to light.   Cardiovascular:      Rate and Rhythm: Normal rate and regular rhythm.      Pulses: Normal pulses.      Heart sounds: Normal heart sounds. No murmur heard.     No gallop.   Pulmonary:      Effort: Pulmonary effort is normal.      Breath sounds: Normal breath sounds.   Abdominal:      General: Abdomen is flat. Bowel sounds are normal.      Palpations: Abdomen is soft.      Tenderness: There is no abdominal tenderness. There is no guarding or rebound.   Musculoskeletal:         General: Normal range of motion.      Cervical back: Normal range of motion and neck supple.      Right lower leg: No edema.      Left lower leg: No edema.   Skin:     General: Skin is warm and dry.      Capillary Refill: Capillary refill takes less than 2 seconds.      Coloration: Skin is not jaundiced or pale.      Findings: No bruising, erythema, lesion or rash.   Neurological:      General: No focal deficit present.      Mental Status: He is alert and oriented to person, place, and time. Mental status is at baseline.      Cranial Nerves: No cranial nerve deficit.      Sensory: No sensory deficit.      Motor: No weakness.      Coordination: Coordination normal.      Deep Tendon Reflexes: Reflexes normal.   Psychiatric:         Mood and Affect: Mood normal.         Behavior: Behavior normal.         Outpatient Follow-Up  Cardiology - 2/2/24  PCP: 5-7days       Philip Morrissey DO

## 2024-01-25 NOTE — CARE PLAN
The clinical goals for the shift include no compliants of pain through shift    Pt had an uneventful night. No complaints of pain. Dressing remains dry and intact.

## 2024-01-26 ENCOUNTER — PATIENT OUTREACH (OUTPATIENT)
Dept: CARE COORDINATION | Facility: CLINIC | Age: 69
End: 2024-01-26
Payer: MEDICARE

## 2024-01-26 NOTE — PROGRESS NOTES
[facility]  Admitted 1/23/2024  Discharged 1/25/2024  Dx: 3rd degree Atrioventricular Block  1/24/2024 s/p: permanent pacemaker placed, Acute Urinary Retention, RONALDO, BPH    Outreach call to patient to support a smooth transition of care from recent admission.  Spoke with patient, reviewed discharge medications, discharge instructions, assessed social needs, and provided education on importance of follow-up appointment with provider.  Will continue to monitor through transition period.    Patient states, he is doing good. Patient states, he has some soreness but not pain. Denies any urinary retention  Scheduled to see Cardiology 2/2/2024    Engagement  Call Start Time: 1316 (1/26/2024  1:16 PM)    Medications  Medications reviewed with patient/caregiver?: Yes (1/26/2024  1:16 PM)  Is the patient having any side effects they believe may be caused by any medication additions or changes?: No (1/26/2024  1:16 PM)  Does the patient have all medications ordered at discharge?: Yes (1/26/2024  1:16 PM)  Care Management Interventions: No intervention needed (1/26/2024  1:16 PM)  Is the patient taking all medications as directed (includes completed medication regime)?: Yes (1/26/2024  1:16 PM)    Appointments  Does the patient have a primary care provider?: Yes (1/26/2024  1:16 PM)  Care Management Interventions: Educated patient on importance of making appointment; Advised patient to make appointment (1/26/2024  1:16 PM)    Self Management  What is the home health agency?: not applicable (1/26/2024  1:16 PM)  What Durable Medical Equipment (DME) was ordered?: not applicable (1/26/2024  1:16 PM)    Patient Teaching  Does the patient have access to their discharge instructions?: Yes (1/26/2024  1:16 PM)  Care Management Interventions: Reviewed instructions with patient (1/26/2024  1:16 PM)  What is the patient's perception of their health status since discharge?: Improving (1/26/2024  1:16 PM)  Is the  patient/caregiver able to teach back the hierarchy of who to call/visit for symptoms/problems? PCP, Specialist, Home Health nurse, Urgent Care, ED, 911: Yes (1/26/2024  1:16 PM)    Brionna Cartagena RN/CM  [facility]  [phone]

## 2024-01-27 LAB
ATRIAL RATE: 31 BPM
P AXIS: 87 DEGREES
Q ONSET: 228 MS
QRS COUNT: 5 BEATS
QRS DURATION: 134 MS
QT INTERVAL: 530 MS
QTC CALCULATION(BAZETT): 374 MS
QTC FREDERICIA: 420 MS
R AXIS: -72 DEGREES
T AXIS: 24 DEGREES
T OFFSET: 493 MS
VENTRICULAR RATE: 30 BPM

## 2024-01-29 NOTE — SIGNIFICANT EVENT
Follow Up Phone Call    Outgoing phone call    Spoke to: Timmy Mayer Relationship:self   Phone number: [phone]      Outcome: I left a message on answering machine   Chief Complaint   Patient presents with   • Chest Pain     Pt informed me in triage he has been having chest pain, SOB and swollen feet for a few weeks.          Diagnosis:Not applicable

## 2024-01-31 ENCOUNTER — OFFICE VISIT (OUTPATIENT)
Dept: PRIMARY CARE | Facility: CLINIC | Age: 69
End: 2024-01-31
Payer: MEDICARE

## 2024-01-31 VITALS
DIASTOLIC BLOOD PRESSURE: 70 MMHG | BODY MASS INDEX: 24.96 KG/M2 | HEART RATE: 87 BPM | TEMPERATURE: 96.5 F | HEIGHT: 67 IN | SYSTOLIC BLOOD PRESSURE: 118 MMHG | OXYGEN SATURATION: 97 % | WEIGHT: 159 LBS

## 2024-01-31 DIAGNOSIS — J42 CHRONIC BRONCHITIS, UNSPECIFIED CHRONIC BRONCHITIS TYPE (MULTI): ICD-10-CM

## 2024-01-31 DIAGNOSIS — N17.9 AKI (ACUTE KIDNEY INJURY) (CMS-HCC): Primary | ICD-10-CM

## 2024-01-31 DIAGNOSIS — R33.8 BENIGN PROSTATIC HYPERPLASIA WITH URINARY RETENTION: ICD-10-CM

## 2024-01-31 DIAGNOSIS — I44.2 THIRD DEGREE HEART BLOCK (MULTI): ICD-10-CM

## 2024-01-31 DIAGNOSIS — N40.1 BENIGN PROSTATIC HYPERPLASIA WITH URINARY RETENTION: ICD-10-CM

## 2024-01-31 LAB
ANION GAP SERPL CALC-SCNC: 18 MMOL/L (ref 10–20)
BUN SERPL-MCNC: 20 MG/DL (ref 6–23)
CALCIUM SERPL-MCNC: 9.8 MG/DL (ref 8.6–10.3)
CHLORIDE SERPL-SCNC: 97 MMOL/L (ref 98–107)
CO2 SERPL-SCNC: 26 MMOL/L (ref 21–32)
CREAT SERPL-MCNC: 0.96 MG/DL (ref 0.5–1.3)
EGFRCR SERPLBLD CKD-EPI 2021: 86 ML/MIN/1.73M*2
GLUCOSE SERPL-MCNC: 91 MG/DL (ref 74–99)
POTASSIUM SERPL-SCNC: 4.6 MMOL/L (ref 3.5–5.3)
SODIUM SERPL-SCNC: 136 MMOL/L (ref 136–145)

## 2024-01-31 PROCEDURE — 1111F DSCHRG MED/CURRENT MED MERGE: CPT

## 2024-01-31 PROCEDURE — 80048 BASIC METABOLIC PNL TOTAL CA: CPT

## 2024-01-31 PROCEDURE — 1159F MED LIST DOCD IN RCRD: CPT

## 2024-01-31 PROCEDURE — 36415 COLL VENOUS BLD VENIPUNCTURE: CPT

## 2024-01-31 PROCEDURE — 1126F AMNT PAIN NOTED NONE PRSNT: CPT

## 2024-01-31 PROCEDURE — 99496 TRANSJ CARE MGMT HIGH F2F 7D: CPT

## 2024-01-31 PROCEDURE — 1036F TOBACCO NON-USER: CPT

## 2024-01-31 RX ORDER — ALBUTEROL SULFATE 90 UG/1
2 AEROSOL, METERED RESPIRATORY (INHALATION) EVERY 4 HOURS PRN
Qty: 18 G | Refills: 3 | Status: SHIPPED | OUTPATIENT
Start: 2024-01-31 | End: 2024-04-30 | Stop reason: SDUPTHER

## 2024-01-31 RX ORDER — BUDESONIDE AND FORMOTEROL FUMARATE DIHYDRATE 160; 4.5 UG/1; UG/1
2 AEROSOL RESPIRATORY (INHALATION)
Qty: 6 G | Refills: 0 | Status: SHIPPED | OUTPATIENT
Start: 2024-01-31 | End: 2024-02-29 | Stop reason: SDUPTHER

## 2024-01-31 RX ORDER — BUDESONIDE AND FORMOTEROL FUMARATE DIHYDRATE 160; 4.5 UG/1; UG/1
2 AEROSOL RESPIRATORY (INHALATION) 2 TIMES DAILY
COMMUNITY
Start: 2014-08-22 | End: 2024-01-31 | Stop reason: SDUPTHER

## 2024-01-31 RX ORDER — ALBUTEROL SULFATE 90 UG/1
2 AEROSOL, METERED RESPIRATORY (INHALATION) EVERY 4 HOURS PRN
COMMUNITY
Start: 2014-05-27 | End: 2024-01-31 | Stop reason: SDUPTHER

## 2024-01-31 ASSESSMENT — ENCOUNTER SYMPTOMS
GASTROINTESTINAL NEGATIVE: 1
ACTIVITY CHANGE: 1
OCCASIONAL FEELINGS OF UNSTEADINESS: 0
FEVER: 0
ABDOMINAL PAIN: 0
ENDOCRINE NEGATIVE: 1
ALLERGIC/IMMUNOLOGIC NEGATIVE: 1
PSYCHIATRIC NEGATIVE: 1
DIZZINESS: 0
LOSS OF SENSATION IN FEET: 0
WEAKNESS: 0
UNEXPECTED WEIGHT CHANGE: 0
COUGH: 1
CARDIOVASCULAR NEGATIVE: 1
SHORTNESS OF BREATH: 1
DEPRESSION: 0
FATIGUE: 0
MUSCULOSKELETAL NEGATIVE: 1
HEADACHES: 0

## 2024-01-31 ASSESSMENT — SOCIAL DETERMINANTS OF HEALTH (SDOH)
WITHIN THE LAST YEAR, HAVE TO BEEN RAPED OR FORCED TO HAVE ANY KIND OF SEXUAL ACTIVITY BY YOUR PARTNER OR EX-PARTNER?: NO
WITHIN THE LAST YEAR, HAVE YOU BEEN HUMILIATED OR EMOTIONALLY ABUSED IN OTHER WAYS BY YOUR PARTNER OR EX-PARTNER?: NO
WITHIN THE LAST YEAR, HAVE YOU BEEN KICKED, HIT, SLAPPED, OR OTHERWISE PHYSICALLY HURT BY YOUR PARTNER OR EX-PARTNER?: NO
WITHIN THE LAST YEAR, HAVE YOU BEEN AFRAID OF YOUR PARTNER OR EX-PARTNER?: NO

## 2024-01-31 NOTE — PROGRESS NOTES
Subjective   Patient ID: Timmy Mayer is a 68 y.o. male who presents for [facility] (Timmy is here to [facility] care. He just had a pace maker placed, is still having troubles breathing. ).  Timmy is a 68-year-old gentleman who presents to [facility] care with his wife  Recent admission for chest pain, CHF, third-degree heart block  Was in ICU for multiple days and received pacemaker    Discharged on tamsulosin and antibiotic Keflex  Overall doing well, hemodynamically stable, renal function was impaired on discharge we will repeat today  He sees cardiologist in 2 days    Main complaint today is his COPD which she was not discharged on medications for  On examination from the chart patient does have hyperinflation of lungs on x-ray as well as diagnose COPD with previous family practice provider  We will start him back on his Symbicort as well as albuterol inhaler as needed for shortness of breath    Blood work was reviewed, lipid panel is excellent and blood pressure is good  Pacemaker implantation site is clean dry intact and open to air, no bleeding noted no dehiscence of surgical wound          Vitals:    01/31/24 0955   BP: 118/70   Pulse: 87   Temp: 35.8 °C (96.5 °F)   SpO2: 97%       Review of Systems   Constitutional:  Positive for activity change. Negative for fatigue, fever and unexpected weight change.   HENT: Negative.     Respiratory:  Positive for cough and shortness of breath.    Cardiovascular: Negative.  Negative for chest pain.   Gastrointestinal: Negative.  Negative for abdominal pain.   Endocrine: Negative.    Musculoskeletal: Negative.    Skin: Negative.    Allergic/Immunologic: Negative.    Neurological:  Negative for dizziness, weakness and headaches.   Psychiatric/Behavioral: Negative.         Objective   Physical Exam  Vitals and nursing note reviewed.   Constitutional:       Appearance: Normal appearance.   HENT:      Head: Normocephalic.      Mouth/Throat:      Mouth: Mucous membranes are  moist.   Cardiovascular:      Rate and Rhythm: Normal rate and regular rhythm.      Pulses: Normal pulses.      Heart sounds: Normal heart sounds. No murmur heard.     No friction rub. No gallop.   Pulmonary:      Effort: Pulmonary effort is normal. No respiratory distress.      Breath sounds: Examination of the right-lower field reveals decreased breath sounds. Examination of the left-lower field reveals decreased breath sounds. Decreased breath sounds present. No wheezing.   Abdominal:      General: Bowel sounds are normal. There is no distension.      Palpations: Abdomen is soft.      Tenderness: There is no abdominal tenderness.   Musculoskeletal:         General: No deformity. Normal range of motion.   Skin:     General: Skin is warm and dry.      Capillary Refill: Capillary refill takes less than 2 seconds.   Neurological:      General: No focal deficit present.      Mental Status: He is alert and oriented to person, place, and time.   Psychiatric:         Mood and Affect: Mood normal.         Assessment/Plan   Problem List Items Addressed This Visit    None  Visit Diagnoses       RONALDO (acute kidney injury) (CMS/HCC)    -  Primary    Relevant Orders    Basic metabolic panel    Third degree heart block (CMS/HCC)        Benign prostatic hyperplasia with urinary retention        Chronic bronchitis, unspecified chronic bronchitis type (CMS/HCC)        Relevant Medications    budesonide-formoteroL (Symbicort) 160-4.5 mcg/actuation inhaler    albuterol 90 mcg/actuation inhaler                 Thank you for coming in today, please call my office if you have any concerns or questions.     Holden GARRETT, CNP

## 2024-01-31 NOTE — PATIENT INSTRUCTIONS
Restart breathing treatments  See back in 1 month breathing follow up    See the specialist    Thank you for coming in today, if any questions or concerns arise, please call my office.   Holden Romo, TAMI-CNP

## 2024-02-01 ENCOUNTER — TELEPHONE (OUTPATIENT)
Dept: PRIMARY CARE | Facility: CLINIC | Age: 69
End: 2024-02-01
Payer: MEDICARE

## 2024-02-01 NOTE — TELEPHONE ENCOUNTER
----- Message from ANGELA Bazan sent at 2/1/2024  8:05 AM EST -----  Results are normal, please notify the patient. Renal Function much improved, he will see Cardiology for follow up this week.

## 2024-02-01 NOTE — RESULT ENCOUNTER NOTE
Results are normal, please notify the patient. Renal Function much improved, he will see Cardiology for follow up this week.

## 2024-02-04 NOTE — DOCUMENTATION CLARIFICATION NOTE
"    PATIENT:               CAESAR MARADIAGA  ACCT #:                  [id number]  MRN:                       [id number]  :                       1955  ADMIT DATE:       2024 6:39 PM  DISCH DATE:        2024 3:30 PM  RESPONDING PROVIDER #:        [id number]          PROVIDER RESPONSE TEXT:    heart failure ruled out    CDI QUERY TEXT:    [facility]_CH[facility]      Instruction:    Based on your assessment of the patient and the clinical information, please provide the requested documentation by clicking on the appropriate radio button and enter any additional information if prompted.    Question: Please further clarify the type and acuity of congestive heart failure    When answering this query, please exercise your independent professional judgment. The fact that a question is being asked, does not imply that any particular answer is desired or expected.    The patient's clinical indicators include:  Clinical Information: 69 y/o male with a BMI of 23.47 presented with chest pain, SOB, and swollen feet.    Clinical Indicators:    Per H and P 24 Anna \"presented to [facility] ED on 24 with chest pain, SOB, and bilateral foot edema. Admitted to ICU for 3rd degree AV block s/p temporary pacemaker insertion and new onset CHF. \"    Per PN 24 Jeskey \"cf new CHF\"    Per TTE 24 \"Left ventricular systolic function is normal with a 60% estimated ejection fraction.\"    Per Labs 24      Per CT angio 24  \"1. No acute cardiopulmonary abnormality. No pulmonary embolism.  2. Severe emphysema.  3. Mild chronic appearing height loss of the T7 and T8 vertebral  bodies.  4. The lung apices and T1 vertebral body are not completely included  in the field of view. If desired, the patient may return for  additional images of this region.\"    Per XR chest 24  \"Cardiomediastinal silhouette is normal in size and configuration.\"      Treatment: Cardiology Consult, IV Lasix 40 mg every 12 hours, TTE, labs, and " supportive care.    Risk Factors: 67 y/o presented with chest pain and SOB with swollen feet  Options provided:  -- Acute Diastolic Congestive Heart Failure  -- Acute on Chronic Diastolic Congestive Heart Failure  -- Chronic Diastolic Congestive Heart Failure  -- Acute combined systolic/diastolic Congestive Heart Failure  -- Acute on Chronic combined systolic/diastolic Congestive Heart Failure  -- Chronic combined systolic/diastolic Congestive Heart Failure  -- Other - I will add my own diagnosis  -- Refer to Clinical Documentation Reviewer    Query created by: Travis Jensen on 1/25/2024 9:55 AM      Electronically signed by:  CHIRAG OTERO DO 2/3/2024 8:28 PM

## 2024-02-07 ENCOUNTER — PATIENT OUTREACH (OUTPATIENT)
Dept: CARE COORDINATION | Facility: CLINIC | Age: 69
End: 2024-02-07
Payer: MEDICARE

## 2024-02-07 NOTE — PROGRESS NOTES
Outreach call to patient following up on appointment with cardiologist.  Discussed appointment, reviewed medications, and discussed plan of care: scheduled for a stress test. Patient with no additional questions at this time.  Will continue to follow.      Brionna Cartagena RN/CM   [facility]  [phone]

## 2024-02-18 LAB
ATRIAL RATE: 64 BPM
ATRIAL RATE: 76 BPM
ATRIAL RATE: 82 BPM
P AXIS: -19 DEGREES
P AXIS: -23 DEGREES
P AXIS: -36 DEGREES
P OFFSET: 144 MS
P OFFSET: 154 MS
P OFFSET: 157 MS
P ONSET: 103 MS
P ONSET: 112 MS
P ONSET: 113 MS
PR INTERVAL: 158 MS
PR INTERVAL: 174 MS
PR INTERVAL: 184 MS
Q ONSET: 192 MS
Q ONSET: 195 MS
Q ONSET: 199 MS
QRS COUNT: 10 BEATS
QRS COUNT: 12 BEATS
QRS COUNT: 20 BEATS
QRS DURATION: 148 MS
QRS DURATION: 152 MS
QRS DURATION: 162 MS
QT INTERVAL: 414 MS
QT INTERVAL: 422 MS
QT INTERVAL: 442 MS
QTC CALCULATION(BAZETT): 455 MS
QTC CALCULATION(BAZETT): 465 MS
QTC CALCULATION(BAZETT): 604 MS
QTC FREDERICIA: 447 MS
QTC FREDERICIA: 451 MS
QTC FREDERICIA: 535 MS
R AXIS: 131 DEGREES
R AXIS: 134 DEGREES
R AXIS: 159 DEGREES
T AXIS: -28 DEGREES
T AXIS: -37 DEGREES
T AXIS: -44 DEGREES
T OFFSET: 403 MS
T OFFSET: 406 MS
T OFFSET: 416 MS
VENTRICULAR RATE: 123 BPM
VENTRICULAR RATE: 64 BPM
VENTRICULAR RATE: 76 BPM

## 2024-02-20 ENCOUNTER — OFFICE VISIT (OUTPATIENT)
Dept: PRIMARY CARE | Facility: CLINIC | Age: 69
End: 2024-02-20
Payer: MEDICARE

## 2024-02-20 VITALS
HEART RATE: 95 BPM | SYSTOLIC BLOOD PRESSURE: 140 MMHG | BODY MASS INDEX: 25.91 KG/M2 | OXYGEN SATURATION: 98 % | DIASTOLIC BLOOD PRESSURE: 80 MMHG | WEIGHT: 163 LBS | TEMPERATURE: 98.6 F

## 2024-02-20 DIAGNOSIS — H35.52 RETINITIS PIGMENTOSA: ICD-10-CM

## 2024-02-20 DIAGNOSIS — J42 CHRONIC BRONCHITIS, UNSPECIFIED CHRONIC BRONCHITIS TYPE (MULTI): Primary | ICD-10-CM

## 2024-02-20 PROCEDURE — 99214 OFFICE O/P EST MOD 30 MIN: CPT

## 2024-02-20 PROCEDURE — 1159F MED LIST DOCD IN RCRD: CPT

## 2024-02-20 PROCEDURE — 1126F AMNT PAIN NOTED NONE PRSNT: CPT

## 2024-02-20 PROCEDURE — 1111F DSCHRG MED/CURRENT MED MERGE: CPT

## 2024-02-20 PROCEDURE — 1036F TOBACCO NON-USER: CPT

## 2024-02-20 RX ORDER — LEVOFLOXACIN 500 MG/1
500 TABLET, FILM COATED ORAL DAILY
Qty: 10 TABLET | Refills: 0 | Status: SHIPPED | OUTPATIENT
Start: 2024-02-20 | End: 2024-03-01

## 2024-02-20 ASSESSMENT — ENCOUNTER SYMPTOMS
ACTIVITY CHANGE: 1
WHEEZING: 1
SHORTNESS OF BREATH: 1

## 2024-02-20 NOTE — PROGRESS NOTES
Subjective   Patient ID: Timmy Mayer is a 68 y.o. male who presents for Shortness of Breath (Timmy is here for shortness of breath. The inhalers are not helping. When he was on the antibiotics his breathing had been better. ).  COPD exacerbatio    Current symptoms include acute dyspnea. Symptoms have been present since several weeks ago and have been gradually worsening. He denies dyspnea, cough, and increased sputum. Associated symptoms include shortness of breath, weakness, and wheezing.  This episode appears to have been triggered by no identifiable factor. Treatments tried for the current exacerbation: albuterol inhaler, albuterol/ipratropium inhaler, and inhaled steroid. The patient has been having similar episodes for approximately 4 weeks.          Vitals:    02/20/24 1101   BP: 140/80   Pulse: 95   Temp: 37 °C (98.6 °F)   SpO2: 98%       Review of Systems   Constitutional:  Positive for activity change.   Respiratory:  Positive for shortness of breath and wheezing.        Objective   Physical Exam  Vitals reviewed.   Constitutional:       General: He is not in acute distress.     Appearance: Normal appearance. He is normal weight. He is not toxic-appearing.   HENT:      Head: Normocephalic.      Nose: Rhinorrhea present.      Mouth/Throat:      Mouth: Mucous membranes are moist.   Cardiovascular:      Rate and Rhythm: Normal rate and regular rhythm.      Pulses: Normal pulses.   Pulmonary:      Effort: Pulmonary effort is normal.      Breath sounds: Rhonchi present.   Neurological:      Mental Status: He is alert.         Assessment/Plan   Problem List Items Addressed This Visit    None  Visit Diagnoses       Chronic bronchitis, unspecified chronic bronchitis type (CMS/HCC)    -  Primary    Relevant Medications    levoFLOXacin (Levaquin) 500 mg tablet    Other Relevant Orders    Referral to Pulmonology    XR chest 2 views    Retinitis pigmentosa        Relevant Orders    Disability Placard                  Thank you for coming in today, please call my office if you have any concerns or questions.     Holden GARRETT, CNP

## 2024-02-20 NOTE — PATIENT INSTRUCTIONS
Restart antibiotic, see umesh [facility] pulmonary  Chest xr    Thank you for coming in today, if any questions or concerns arise, please call my office.   Holden Romo, APRN-CNP

## 2024-02-27 ENCOUNTER — HOSPITAL ENCOUNTER (OUTPATIENT)
Dept: RADIOLOGY | Facility: HOSPITAL | Age: 69
Discharge: HOME | End: 2024-02-27
Payer: MEDICARE

## 2024-02-27 DIAGNOSIS — J42 CHRONIC BRONCHITIS, UNSPECIFIED CHRONIC BRONCHITIS TYPE (MULTI): ICD-10-CM

## 2024-02-27 PROCEDURE — 71046 X-RAY EXAM CHEST 2 VIEWS: CPT

## 2024-02-27 PROCEDURE — 71046 X-RAY EXAM CHEST 2 VIEWS: CPT | Performed by: RADIOLOGY

## 2024-02-28 ENCOUNTER — OFFICE VISIT (OUTPATIENT)
Dept: PULMONOLOGY | Facility: CLINIC | Age: 69
End: 2024-02-28
Payer: MEDICARE

## 2024-02-28 VITALS
BODY MASS INDEX: 25.69 KG/M2 | SYSTOLIC BLOOD PRESSURE: 166 MMHG | DIASTOLIC BLOOD PRESSURE: 90 MMHG | HEART RATE: 63 BPM | WEIGHT: 161.6 LBS | OXYGEN SATURATION: 93 %

## 2024-02-28 DIAGNOSIS — I44.2 HEART BLOCK AV THIRD DEGREE (MULTI): ICD-10-CM

## 2024-02-28 DIAGNOSIS — J43.2 CENTRILOBULAR EMPHYSEMA (MULTI): Primary | ICD-10-CM

## 2024-02-28 PROCEDURE — 1160F RVW MEDS BY RX/DR IN RCRD: CPT | Performed by: NURSE PRACTITIONER

## 2024-02-28 PROCEDURE — 1036F TOBACCO NON-USER: CPT | Performed by: NURSE PRACTITIONER

## 2024-02-28 PROCEDURE — 1159F MED LIST DOCD IN RCRD: CPT | Performed by: NURSE PRACTITIONER

## 2024-02-28 PROCEDURE — 99214 OFFICE O/P EST MOD 30 MIN: CPT | Performed by: NURSE PRACTITIONER

## 2024-02-28 PROCEDURE — 1126F AMNT PAIN NOTED NONE PRSNT: CPT | Performed by: NURSE PRACTITIONER

## 2024-02-28 RX ORDER — IPRATROPIUM BROMIDE AND ALBUTEROL SULFATE 2.5; .5 MG/3ML; MG/3ML
3 SOLUTION RESPIRATORY (INHALATION) 4 TIMES DAILY PRN
Qty: 360 ML | Refills: 11 | Status: SHIPPED | OUTPATIENT
Start: 2024-02-28 | End: 2025-02-27

## 2024-02-28 RX ORDER — GUAIFENESIN 600 MG/1
1200 TABLET, EXTENDED RELEASE ORAL 2 TIMES DAILY
Qty: 60 TABLET | Refills: 11 | Status: SHIPPED | OUTPATIENT
Start: 2024-02-28

## 2024-02-28 ASSESSMENT — ENCOUNTER SYMPTOMS: SHORTNESS OF BREATH: 1

## 2024-02-28 NOTE — PROGRESS NOTES
Subjective   Patient ID: Timmy Mayer is a 69 y.o. male who presents for No chief complaint on file..  HPI New pt here today to establish care. He has been sob with a cough.  He tells me this has been going on for a couple of years but over the last year he has worsened.  Chest CT does show that he has central lobar emphysema.  He does tell me that he was told previously he had COPD.  We had a long talk today about what COPD is and how we treated it.  He is currently on Symbicort he started about 3 weeks ago I would like him to continue.  He has albuterol we talked about how to use that today.  I would like him to have a nebulizer for home use.  He has a lot of mucus I would like him to use Mucinex.    Review of Systems   HENT:  Positive for congestion.    Respiratory:  Positive for shortness of breath.        Objective   Physical Exam  Vitals and nursing note reviewed.   Constitutional:       Appearance: Normal appearance. He is normal weight.   HENT:      Head: Normocephalic.      Nose: Nose normal.   Eyes:      Pupils: Pupils are equal, round, and reactive to light.   Pulmonary:      Effort: Pulmonary effort is normal.      Breath sounds: Normal breath sounds.   Neurological:      General: No focal deficit present.      Mental Status: He is alert and oriented to person, place, and time. Mental status is at baseline.   Psychiatric:         Mood and Affect: Mood normal.         Behavior: Behavior normal.         Thought Content: Thought content normal.         Assessment/Plan     # SOB   - frequent bronchitis    - breathing has worsened over the last year    - lots of chemical exposure    - CT shows severe emphysema    - he has albuterol and uses it frequently    - he started Symbicort on 1/31/2024   -Encouraged him to also use Mucinex   - order pt a nebulizer    - Will order a PFT/walk test     # Heart Failure   - 3rd degree HB   - Pacer   - He follows with cardiology     Mr. Mayer it was a pleasure seeing you in  the office today. We discussed the following:    Please continue to stay on your Symbicort and use your albuterol as needed  I am ordering you a nebulizer with albuterol for home use  I have ordered you a breathing and walking test    Follow up in 2 months with your PFT and walk test             ANGELA Thakkar 02/28/24 12:35 PM

## 2024-02-28 NOTE — PATIENT INSTRUCTIONS
Mr. Mayer it was a pleasure seeing you in the office today. We discussed the following:    Please continue to stay on your Symbicort and use your albuterol as needed  I am ordering you a nebulizer with albuterol for home use  I have ordered you a breathing and walking test    Follow up in 2 months with your PFT and walk test

## 2024-02-29 ENCOUNTER — OFFICE VISIT (OUTPATIENT)
Dept: PRIMARY CARE | Facility: CLINIC | Age: 69
End: 2024-02-29
Payer: MEDICARE

## 2024-02-29 VITALS
WEIGHT: 166 LBS | BODY MASS INDEX: 26.39 KG/M2 | HEART RATE: 76 BPM | DIASTOLIC BLOOD PRESSURE: 80 MMHG | TEMPERATURE: 96.9 F | SYSTOLIC BLOOD PRESSURE: 140 MMHG | OXYGEN SATURATION: 97 %

## 2024-02-29 DIAGNOSIS — J43.2 CENTRILOBULAR EMPHYSEMA (MULTI): Primary | ICD-10-CM

## 2024-02-29 DIAGNOSIS — J42 CHRONIC BRONCHITIS, UNSPECIFIED CHRONIC BRONCHITIS TYPE (MULTI): ICD-10-CM

## 2024-02-29 PROCEDURE — 1036F TOBACCO NON-USER: CPT

## 2024-02-29 PROCEDURE — 1160F RVW MEDS BY RX/DR IN RCRD: CPT

## 2024-02-29 PROCEDURE — 99212 OFFICE O/P EST SF 10 MIN: CPT

## 2024-02-29 PROCEDURE — 1159F MED LIST DOCD IN RCRD: CPT

## 2024-02-29 PROCEDURE — 1126F AMNT PAIN NOTED NONE PRSNT: CPT

## 2024-02-29 RX ORDER — BUDESONIDE AND FORMOTEROL FUMARATE DIHYDRATE 160; 4.5 UG/1; UG/1
2 AEROSOL RESPIRATORY (INHALATION)
Qty: 6 G | Refills: 11 | Status: SHIPPED | OUTPATIENT
Start: 2024-02-29 | End: 2024-05-08

## 2024-02-29 ASSESSMENT — ENCOUNTER SYMPTOMS
WHEEZING: 1
ACTIVITY CHANGE: 0
FATIGUE: 0
APPETITE CHANGE: 0
COUGH: 1
FEVER: 0

## 2024-02-29 NOTE — PROGRESS NOTES
Subjective   Patient ID: Timmy Mayer is a 69 y.o. male who presents for Follow-up (Timmy is here for a follow up of breathing. Nothing really seemed to get better since last visit. ).  COPD follow up  --patient seen by pulm  --pft and 6 minute walk study was ordered  --nebulizer added, continue Symbicort    --refill sent.   No other concerns today        Vitals:    02/29/24 0908   BP: 140/80   Pulse: 76   Temp: 36.1 °C (96.9 °F)   SpO2: 97%       Review of Systems   Constitutional:  Negative for activity change, appetite change, fatigue and fever.   Respiratory:  Positive for cough and wheezing.        Objective   Physical Exam  Vitals and nursing note reviewed.   Constitutional:       Appearance: Normal appearance.   Pulmonary:      Breath sounds: Wheezing and rhonchi present.   Neurological:      Mental Status: He is alert.         Assessment/Plan   Problem List Items Addressed This Visit       Centrilobular emphysema (CMS/HCC) - Primary     Other Visit Diagnoses       Chronic bronchitis, unspecified chronic bronchitis type (CMS/HCC)        Relevant Medications    budesonide-formoteroL (Symbicort) 160-4.5 mcg/actuation inhaler                 Thank you for coming in today, please call my office if you have any concerns or questions.     Holden GARRETT, CNP

## 2024-02-29 NOTE — PATIENT INSTRUCTIONS
I refilled your symbicort   Continue to see the specialist Ana Lilly  Complete walk study and PFT    Thank you for coming in today, if any questions or concerns arise, please call my office.   Holden Romo, TAMI-CNP

## 2024-03-04 ENCOUNTER — PATIENT OUTREACH (OUTPATIENT)
Dept: CARE COORDINATION | Facility: CLINIC | Age: 69
End: 2024-03-04
Payer: MEDICARE

## 2024-03-04 NOTE — PROGRESS NOTES
Outreach call to patient to check in 30 days after hospital discharge to support smooth transition of care. No answer and unable to leave message. No additional outreach needed at this time.     Brionna Cartagena RN/CM  [facility]  [phone]

## 2024-04-12 ENCOUNTER — HOSPITAL ENCOUNTER (OUTPATIENT)
Dept: RESPIRATORY THERAPY | Facility: HOSPITAL | Age: 69
Discharge: HOME | End: 2024-04-12
Payer: MEDICARE

## 2024-04-12 DIAGNOSIS — J43.2 CENTRILOBULAR EMPHYSEMA (MULTI): ICD-10-CM

## 2024-04-12 PROCEDURE — 94729 DIFFUSING CAPACITY: CPT | Performed by: INTERNAL MEDICINE

## 2024-04-12 PROCEDURE — 94060 EVALUATION OF WHEEZING: CPT | Performed by: INTERNAL MEDICINE

## 2024-04-12 PROCEDURE — 94729 DIFFUSING CAPACITY: CPT

## 2024-04-12 PROCEDURE — 94726 PLETHYSMOGRAPHY LUNG VOLUMES: CPT

## 2024-04-12 PROCEDURE — 94618 PULMONARY STRESS TESTING: CPT | Performed by: INTERNAL MEDICINE

## 2024-04-12 PROCEDURE — 94060 EVALUATION OF WHEEZING: CPT

## 2024-04-12 PROCEDURE — 94760 N-INVAS EAR/PLS OXIMETRY 1: CPT

## 2024-04-12 PROCEDURE — 94726 PLETHYSMOGRAPHY LUNG VOLUMES: CPT | Performed by: INTERNAL MEDICINE

## 2024-04-12 PROCEDURE — 94664 DEMO&/EVAL PT USE INHALER: CPT

## 2024-04-12 PROCEDURE — 98960 EDU&TRN PT SELF-MGMT NQHP 1: CPT

## 2024-04-12 PROCEDURE — 94618 PULMONARY STRESS TESTING: CPT

## 2024-04-24 ENCOUNTER — APPOINTMENT (OUTPATIENT)
Dept: PULMONOLOGY | Facility: CLINIC | Age: 69
End: 2024-04-24
Payer: MEDICARE

## 2024-04-30 DIAGNOSIS — J42 CHRONIC BRONCHITIS, UNSPECIFIED CHRONIC BRONCHITIS TYPE (MULTI): ICD-10-CM

## 2024-04-30 RX ORDER — ALBUTEROL SULFATE 90 UG/1
2 AEROSOL, METERED RESPIRATORY (INHALATION) EVERY 4 HOURS PRN
Qty: 18 G | Refills: 5 | Status: SHIPPED | OUTPATIENT
Start: 2024-04-30

## 2024-05-01 LAB
MGC ASCENT PFT - FEV1 - POST: 0.97
MGC ASCENT PFT - FEV1 - POST: 0.97
MGC ASCENT PFT - FEV1 - PRE: 0.92
MGC ASCENT PFT - FEV1 - PRE: 0.92
MGC ASCENT PFT - FEV1 - PREDICTED: 3.06
MGC ASCENT PFT - FEV1 - PREDICTED: 3.06
MGC ASCENT PFT - FVC - POST: 3.08
MGC ASCENT PFT - FVC - POST: 3.08
MGC ASCENT PFT - FVC - PRE: 2.82
MGC ASCENT PFT - FVC - PRE: 2.82
MGC ASCENT PFT - FVC - PREDICTED: 4.05
MGC ASCENT PFT - FVC - PREDICTED: 4.05

## 2024-05-04 ENCOUNTER — LAB (OUTPATIENT)
Dept: LAB | Facility: LAB | Age: 69
End: 2024-05-04
Payer: MEDICARE

## 2024-05-04 DIAGNOSIS — N13.39 OTHER HYDRONEPHROSIS: ICD-10-CM

## 2024-05-04 DIAGNOSIS — I25.118 ATHEROSCLEROSIS OF NATIVE CORONARY ARTERY OF NATIVE HEART WITH OTHER FORM OF ANGINA PECTORIS (CMS-HCC): ICD-10-CM

## 2024-05-04 DIAGNOSIS — R06.02 SOB (SHORTNESS OF BREATH): ICD-10-CM

## 2024-05-04 DIAGNOSIS — R33.8 BENIGN PROSTATIC HYPERPLASIA WITH URINARY RETENTION: ICD-10-CM

## 2024-05-04 DIAGNOSIS — I20.9 ANGINA PECTORIS (CMS-HCC): ICD-10-CM

## 2024-05-04 DIAGNOSIS — R94.31 ABNORMAL ELECTROCARDIOGRAM: ICD-10-CM

## 2024-05-04 DIAGNOSIS — N40.1 BENIGN PROSTATIC HYPERPLASIA WITH URINARY RETENTION: ICD-10-CM

## 2024-05-04 LAB
ANION GAP SERPL CALC-SCNC: 14 MMOL/L (ref 10–20)
APTT PPP: 35 SECONDS (ref 27–38)
BUN SERPL-MCNC: 18 MG/DL (ref 6–23)
CALCIUM SERPL-MCNC: 9.4 MG/DL (ref 8.6–10.3)
CHLORIDE SERPL-SCNC: 100 MMOL/L (ref 98–107)
CO2 SERPL-SCNC: 29 MMOL/L (ref 21–32)
CREAT SERPL-MCNC: 1.05 MG/DL (ref 0.5–1.3)
EGFRCR SERPLBLD CKD-EPI 2021: 76 ML/MIN/1.73M*2
ERYTHROCYTE [DISTWIDTH] IN BLOOD BY AUTOMATED COUNT: 14.2 % (ref 11.5–14.5)
GLUCOSE SERPL-MCNC: 88 MG/DL (ref 74–99)
HCT VFR BLD AUTO: 48.2 % (ref 41–52)
HGB BLD-MCNC: 16 G/DL (ref 13.5–17.5)
INR PPP: 1 (ref 0.9–1.1)
MCH RBC QN AUTO: 32.3 PG (ref 26–34)
MCHC RBC AUTO-ENTMCNC: 33.2 G/DL (ref 32–36)
MCV RBC AUTO: 97 FL (ref 80–100)
NRBC BLD-RTO: 0 /100 WBCS (ref 0–0)
PLATELET # BLD AUTO: 306 X10*3/UL (ref 150–450)
POTASSIUM SERPL-SCNC: 4.1 MMOL/L (ref 3.5–5.3)
PROTHROMBIN TIME: 10.9 SECONDS (ref 9.8–12.8)
RBC # BLD AUTO: 4.96 X10*6/UL (ref 4.5–5.9)
SODIUM SERPL-SCNC: 139 MMOL/L (ref 136–145)
WBC # BLD AUTO: 8 X10*3/UL (ref 4.4–11.3)

## 2024-05-04 PROCEDURE — 80048 BASIC METABOLIC PNL TOTAL CA: CPT

## 2024-05-04 PROCEDURE — 85730 THROMBOPLASTIN TIME PARTIAL: CPT

## 2024-05-04 PROCEDURE — 85610 PROTHROMBIN TIME: CPT

## 2024-05-04 PROCEDURE — 85027 COMPLETE CBC AUTOMATED: CPT

## 2024-05-04 PROCEDURE — 36415 COLL VENOUS BLD VENIPUNCTURE: CPT

## 2024-05-08 ENCOUNTER — HOSPITAL ENCOUNTER (OUTPATIENT)
Facility: HOSPITAL | Age: 69
Setting detail: OUTPATIENT SURGERY
Discharge: HOME | End: 2024-05-08
Attending: INTERNAL MEDICINE | Admitting: INTERNAL MEDICINE
Payer: MEDICARE

## 2024-05-08 ENCOUNTER — APPOINTMENT (OUTPATIENT)
Dept: CARDIOLOGY | Facility: HOSPITAL | Age: 69
End: 2024-05-08
Payer: MEDICARE

## 2024-05-08 VITALS
BODY MASS INDEX: 22.6 KG/M2 | WEIGHT: 157.85 LBS | HEIGHT: 70 IN | DIASTOLIC BLOOD PRESSURE: 88 MMHG | OXYGEN SATURATION: 96 % | HEART RATE: 62 BPM | SYSTOLIC BLOOD PRESSURE: 168 MMHG | RESPIRATION RATE: 16 BRPM

## 2024-05-08 DIAGNOSIS — R93.1 ABNORMAL FINDINGS ON DIAGNOSTIC IMAGING OF HEART AND CORONARY CIRCULATION: ICD-10-CM

## 2024-05-08 DIAGNOSIS — I25.119 ATHEROSCLEROTIC HEART DISEASE OF NATIVE CORONARY ARTERY WITH UNSPECIFIED ANGINA PECTORIS (CMS-HCC): ICD-10-CM

## 2024-05-08 DIAGNOSIS — I20.89 EQUIVALENT ANGINA (CMS-HCC): ICD-10-CM

## 2024-05-08 DIAGNOSIS — I25.118 ATHEROSCLEROSIS OF NATIVE CORONARY ARTERY OF NATIVE HEART WITH OTHER FORM OF ANGINA PECTORIS (CMS-HCC): ICD-10-CM

## 2024-05-08 DIAGNOSIS — R94.30 ABNORMAL RESULT OF CARDIOVASCULAR FUNCTION STUDY, UNSPECIFIED: ICD-10-CM

## 2024-05-08 DIAGNOSIS — I20.9 ANGINA PECTORIS, UNSPECIFIED (CMS-HCC): ICD-10-CM

## 2024-05-08 PROCEDURE — G0269 OCCLUSIVE DEVICE IN VEIN ART: HCPCS | Mod: TC | Performed by: INTERNAL MEDICINE

## 2024-05-08 PROCEDURE — 2500000004 HC RX 250 GENERAL PHARMACY W/ HCPCS (ALT 636 FOR OP/ED): Performed by: INTERNAL MEDICINE

## 2024-05-08 PROCEDURE — 2500000005 HC RX 250 GENERAL PHARMACY W/O HCPCS: Performed by: INTERNAL MEDICINE

## 2024-05-08 PROCEDURE — C1760 CLOSURE DEV, VASC: HCPCS | Performed by: INTERNAL MEDICINE

## 2024-05-08 PROCEDURE — 7100000010 HC PHASE TWO TIME - EACH INCREMENTAL 1 MINUTE: Performed by: INTERNAL MEDICINE

## 2024-05-08 PROCEDURE — 2500000001 HC RX 250 WO HCPCS SELF ADMINISTERED DRUGS (ALT 637 FOR MEDICARE OP): Performed by: INTERNAL MEDICINE

## 2024-05-08 PROCEDURE — C1874 STENT, COATED/COV W/DEL SYS: HCPCS | Performed by: INTERNAL MEDICINE

## 2024-05-08 PROCEDURE — 2550000001 HC RX 255 CONTRASTS: Performed by: INTERNAL MEDICINE

## 2024-05-08 PROCEDURE — C1887 CATHETER, GUIDING: HCPCS | Performed by: INTERNAL MEDICINE

## 2024-05-08 PROCEDURE — 93458 L HRT ARTERY/VENTRICLE ANGIO: CPT | Performed by: INTERNAL MEDICINE

## 2024-05-08 PROCEDURE — 2720000007 HC OR 272 NO HCPCS: Performed by: INTERNAL MEDICINE

## 2024-05-08 PROCEDURE — 2500000006 HC RX 250 W HCPCS SELF ADMINISTERED DRUGS (ALT 637 FOR ALL PAYERS): Performed by: INTERNAL MEDICINE

## 2024-05-08 PROCEDURE — 92928 PRQ TCAT PLMT NTRAC ST 1 LES: CPT | Performed by: INTERNAL MEDICINE

## 2024-05-08 PROCEDURE — 7100000009 HC PHASE TWO TIME - INITIAL BASE CHARGE: Performed by: INTERNAL MEDICINE

## 2024-05-08 PROCEDURE — 93010 ELECTROCARDIOGRAM REPORT: CPT | Performed by: INTERNAL MEDICINE

## 2024-05-08 PROCEDURE — 2780000003 HC OR 278 NO HCPCS: Performed by: INTERNAL MEDICINE

## 2024-05-08 PROCEDURE — C1894 INTRO/SHEATH, NON-LASER: HCPCS | Performed by: INTERNAL MEDICINE

## 2024-05-08 PROCEDURE — C1769 GUIDE WIRE: HCPCS | Performed by: INTERNAL MEDICINE

## 2024-05-08 PROCEDURE — 99152 MOD SED SAME PHYS/QHP 5/>YRS: CPT | Performed by: INTERNAL MEDICINE

## 2024-05-08 PROCEDURE — C1725 CATH, TRANSLUMIN NON-LASER: HCPCS | Performed by: INTERNAL MEDICINE

## 2024-05-08 PROCEDURE — 93005 ELECTROCARDIOGRAM TRACING: CPT | Mod: 59

## 2024-05-08 PROCEDURE — 85347 COAGULATION TIME ACTIVATED: CPT | Performed by: INTERNAL MEDICINE

## 2024-05-08 PROCEDURE — C9600 PERC DRUG-EL COR STENT SING: HCPCS | Performed by: INTERNAL MEDICINE

## 2024-05-08 DEVICE — STENT, ONYX FRONTIER DES, 2.50 X 15RX: Type: IMPLANTABLE DEVICE | Site: CORONARY | Status: FUNCTIONAL

## 2024-05-08 RX ORDER — PRASUGREL 10 MG/1
TABLET, FILM COATED ORAL AS NEEDED
Status: DISCONTINUED | OUTPATIENT
Start: 2024-05-08 | End: 2024-05-08 | Stop reason: HOSPADM

## 2024-05-08 RX ORDER — HEPARIN SODIUM 1000 [USP'U]/ML
INJECTION, SOLUTION INTRAVENOUS; SUBCUTANEOUS AS NEEDED
Status: DISCONTINUED | OUTPATIENT
Start: 2024-05-08 | End: 2024-05-08 | Stop reason: HOSPADM

## 2024-05-08 RX ORDER — FENTANYL CITRATE 50 UG/ML
50 INJECTION, SOLUTION INTRAMUSCULAR; INTRAVENOUS ONCE
Status: COMPLETED | OUTPATIENT
Start: 2024-05-08 | End: 2024-05-08

## 2024-05-08 RX ORDER — LIDOCAINE HYDROCHLORIDE 20 MG/ML
INJECTION, SOLUTION INFILTRATION; PERINEURAL AS NEEDED
Status: DISCONTINUED | OUTPATIENT
Start: 2024-05-08 | End: 2024-05-08 | Stop reason: HOSPADM

## 2024-05-08 RX ORDER — FENTANYL CITRATE 50 UG/ML
INJECTION, SOLUTION INTRAMUSCULAR; INTRAVENOUS AS NEEDED
Status: DISCONTINUED | OUTPATIENT
Start: 2024-05-08 | End: 2024-05-08 | Stop reason: HOSPADM

## 2024-05-08 RX ORDER — ASPIRIN 81 MG/1
81 TABLET ORAL DAILY
COMMUNITY

## 2024-05-08 RX ORDER — ASPIRIN 325 MG
TABLET ORAL AS NEEDED
Status: DISCONTINUED | OUTPATIENT
Start: 2024-05-08 | End: 2024-05-08 | Stop reason: HOSPADM

## 2024-05-08 RX ORDER — PRASUGREL 10 MG/1
10 TABLET, FILM COATED ORAL DAILY
Qty: 90 TABLET | Refills: 3 | Status: SHIPPED | OUTPATIENT
Start: 2024-05-08 | End: 2025-05-08

## 2024-05-08 RX ORDER — SODIUM CHLORIDE 9 MG/ML
INJECTION, SOLUTION INTRAVENOUS CONTINUOUS PRN
Status: COMPLETED | OUTPATIENT
Start: 2024-05-08 | End: 2024-05-08

## 2024-05-08 RX ORDER — NITROGLYCERIN 40 MG/100ML
INJECTION INTRAVENOUS AS NEEDED
Status: DISCONTINUED | OUTPATIENT
Start: 2024-05-08 | End: 2024-05-08 | Stop reason: HOSPADM

## 2024-05-08 RX ORDER — MIDAZOLAM HYDROCHLORIDE 1 MG/ML
INJECTION, SOLUTION INTRAMUSCULAR; INTRAVENOUS AS NEEDED
Status: DISCONTINUED | OUTPATIENT
Start: 2024-05-08 | End: 2024-05-08 | Stop reason: HOSPADM

## 2024-05-08 RX ADMIN — FENTANYL CITRATE 50 MCG: 50 INJECTION, SOLUTION INTRAMUSCULAR; INTRAVENOUS at 11:45

## 2024-05-08 RX ADMIN — FENTANYL CITRATE 50 MCG: 50 INJECTION, SOLUTION INTRAMUSCULAR; INTRAVENOUS at 11:00

## 2024-05-08 ASSESSMENT — PAIN - FUNCTIONAL ASSESSMENT
PAIN_FUNCTIONAL_ASSESSMENT: 0-10

## 2024-05-08 ASSESSMENT — PAIN SCALES - GENERAL
PAINLEVEL_OUTOF10: 0 - NO PAIN
PAINLEVEL_OUTOF10: 2
PAINLEVEL_OUTOF10: 0 - NO PAIN
PAINLEVEL_OUTOF10: 10 - WORST POSSIBLE PAIN
PAINLEVEL_OUTOF10: 5 - MODERATE PAIN
PAINLEVEL_OUTOF10: 10 - WORST POSSIBLE PAIN
PAINLEVEL_OUTOF10: 2
PAINLEVEL_OUTOF10: 0 - NO PAIN
PAINLEVEL_OUTOF10: 2

## 2024-05-08 ASSESSMENT — COLUMBIA-SUICIDE SEVERITY RATING SCALE - C-SSRS
2. HAVE YOU ACTUALLY HAD ANY THOUGHTS OF KILLING YOURSELF?: NO
6. HAVE YOU EVER DONE ANYTHING, STARTED TO DO ANYTHING, OR PREPARED TO DO ANYTHING TO END YOUR LIFE?: NO
1. IN THE PAST MONTH, HAVE YOU WISHED YOU WERE DEAD OR WISHED YOU COULD GO TO SLEEP AND NOT WAKE UP?: NO

## 2024-05-08 NOTE — Clinical Note
Angioplasty of the distal right coronary artery lesion. Inflation 1: Pressure = 12 christine; Duration = 35 sec.

## 2024-05-08 NOTE — H&P
History Of Present Illness  Timmy Mayer is a 69 y.o. male presenting with ANGINA.     Past Medical History  No past medical history on file.    Surgical History  Past Surgical History:   Procedure Laterality Date    CARDIAC ELECTROPHYSIOLOGY PROCEDURE N/A 01/23/2024    Procedure: Temporary Pacemaker Insertion;  Surgeon: Johnny Adan MD;  Location: [facility] Cardiac Cath Lab;  Service: Cardiovascular;  Laterality: N/A;    CARDIAC ELECTROPHYSIOLOGY PROCEDURE Left 01/24/2024    Procedure: PPM dual IMPLANT;  Surgeon: Carmelo Jarrett MD;  Location: [facility] Cardiac Cath Lab;  Service: Electrophysiology;  Laterality: Left;    HERNIA REPAIR      TESTICLE SURGERY          Social History  He reports that he has quit smoking. His smoking use included cigarettes. He has never used smokeless tobacco. He reports that he does not currently use alcohol. He reports current drug use. Drug: Marijuana.    Family History  Family History   Problem Relation Name Age of Onset    Diabetes Mother      Heart failure Mother      Diabetes Father          Allergies  Patient has no known allergies.    Review of Systems   Comprehensive 10-point review of systems negative otherwise as noted above in HPI    Physical Exam   Constitutional: Well developed, awake/alert/oriented x3, no distress, alert and cooperative  Eyes: PERRL, EOMI, clear sclera  ENMT: mucous membranes moist, no apparent injury, no lesions seen  Head/Neck: Neck supple, no apparent injury, thyroid without mass or tenderness, No JVD, trachea midline, no bruits  Respiratory/Thorax: Patent airways, CTAB, normal breath sounds with good chest expansion, thorax symmetric  Cardiovascular: Regular, rate and rhythm, no murmurs, 2+ equal pulses of the extremities, normal S 1and S 2  Gastrointestinal: Nondistended, soft, non-tender, no rebound tenderness or guarding, no masses palpable, no organomegaly, +BS, no bruits  Musculoskeletal: ROM intact, no joint swelling, normal strength  Extremities:  "normal extremities, no cyanosis edema, contusions or wounds, no clubbing  Neurological: alert and oriented x3, intact senses, motor, response and reflexes, normal strength  Lymphatic: No significant lymphadenopathy  Psychological: Appropriate mood and behavior  Skin: Warm and dry, no lesions, no rashes   Last Recorded Vitals  Blood pressure 160/86, pulse 61, resp. rate 18, height 1.778 m (5' 10\"), weight 71.7 kg (158 lb), SpO2 97%.    Relevant Results        PLEASE SEE OFFICE NOTES FOR DETAILS     Assessment/Plan   Active Problems:  There are no active Hospital Problems.      PROCEED WITH CORONARY ANGIOGRAPHY CONSENT OBTAINED       I spent 15 minutes in the professional and overall care of this patient.      Carmelo Jarrett MD    "

## 2024-05-08 NOTE — SIGNIFICANT EVENT
Pt. Sitting up per protocol, slight nausea noted, no dizziness. VSS. Nausea resolved shortly after sitting up, discharge instructions given and questions answered. Awaiting discharge instructions.

## 2024-05-08 NOTE — DISCHARGE INSTRUCTIONS
Sedation  Do not drive a vehicle or use machinery that can get you hurt for 24hrs.  Do not dink any alcoholic drinks or take any non-prescriptive medications that contain alcohol for 24hrs.  Do not make any important decisions for 24hrs.    Activity  You are advised to go directly home from the hospital.  DO NOT lift anything heavier than 10 pounds for one week, this allows for proper healing of  the groin.  Keep stair climbing to a minimum for the firs day you are home.  No sexual activity for 24hrs after you arrive home.    Diet  You may resume your normal diet.    Groin Care  If you start bleeding from your groin, lay down and have someone apply firm pressure just above the puncture site. If bleeding does not stop after five minutes of firm pressure or if you cannot control the bleeding, call 911. Also, always notify your doctor if bleeding occurs.    Gently cleanse the puncture site in your groin with soap and water only.   DO NOT soak in the bathtub or go swimming for one week to help prevent infection.   It is normal to have bruising or soreness at the groin site.   Watch for signs and symptoms of infection: Redness, swelling, pus or foul smelling drainage, increased tenderness or fever.   Remove the dressing in the morning and cover with a band-aid, change the band-aid every day and keep the puncture site covered for three days.   You may shower the next day after your procedure.    Specific Concerns to watch for:  If after you are discharged you develop difficulty breathing, rash, hives, severe nausea, vomiting, lightheadedness or any signs of infection, please contact your doctor and go to the nearest emergency room.    Safety  It is recommended that a responsible adult be with you for the first 24 hours. This is for your protection and safety since you may not be as alert as usual with the drugs from your procedure still in your system.    Follow-up   Call your doctor within 1 week to schedule a follow-up  appointment.   If you have any questions about the effects of the sedative drugs or groin care, call the physician responsible for your procedure.    Signature of patient or accompanying adult_________________________________________________________Date__________________________    Signature of nurse____________________________________________________________________________________Date__________________________

## 2024-05-08 NOTE — SIGNIFICANT EVENT
Pt.'s wife at bedside. Pt. Complaining of 10/10 chronic back pain. Per MD. Hobson, Fentanyl ordered.

## 2024-05-08 NOTE — Clinical Note
Angioplasty of the distal right coronary artery lesion. Inflation 1: Pressure = 24 christine; Duration = 24 sec.

## 2024-05-09 ENCOUNTER — OFFICE VISIT (OUTPATIENT)
Dept: PULMONOLOGY | Facility: CLINIC | Age: 69
End: 2024-05-09
Payer: MEDICARE

## 2024-05-09 VITALS
DIASTOLIC BLOOD PRESSURE: 78 MMHG | HEART RATE: 104 BPM | WEIGHT: 157.6 LBS | OXYGEN SATURATION: 98 % | SYSTOLIC BLOOD PRESSURE: 131 MMHG | BODY MASS INDEX: 22.61 KG/M2

## 2024-05-09 DIAGNOSIS — J43.2 CENTRILOBULAR EMPHYSEMA (MULTI): Primary | ICD-10-CM

## 2024-05-09 DIAGNOSIS — R06.02 SHORTNESS OF BREATH: ICD-10-CM

## 2024-05-09 DIAGNOSIS — I50.9 CONGESTIVE HEART FAILURE, UNSPECIFIED HF CHRONICITY, UNSPECIFIED HEART FAILURE TYPE (MULTI): ICD-10-CM

## 2024-05-09 PROCEDURE — 99215 OFFICE O/P EST HI 40 MIN: CPT | Performed by: PEDIATRICS

## 2024-05-09 PROCEDURE — 1160F RVW MEDS BY RX/DR IN RCRD: CPT | Performed by: PEDIATRICS

## 2024-05-09 PROCEDURE — 1036F TOBACCO NON-USER: CPT | Performed by: PEDIATRICS

## 2024-05-09 PROCEDURE — 1159F MED LIST DOCD IN RCRD: CPT | Performed by: PEDIATRICS

## 2024-05-09 NOTE — PROGRESS NOTES
Subjective   Patient ID: Timmy Mayer is a 69 y.o. male who presents for COPD/emphysema    HPI    2/28/2024:  Mr Mayer is doing about the same.  He is using symbicort and albuterol and feels that helps.  He does get short of breath with walking.  He did a PFT that shows very severe obstruction with small airways bronchodilator response.  He did not desaturate with walking (Dom saturation 92%).  He continues to smoke marijuana    Initial visit Ana Lilly 2/28/2024:   New pt here today to establish care. He has been sob with a cough.  He tells me this has been going on for a couple of years but over the last year he has worsened.  Chest CT does show that he has central lobar emphysema.  He does tell me that he was told previously he had COPD.  We had a long talk today about what COPD is and how we treated it.  He is currently on Symbicort he started about 3 weeks ago I would like him to continue.  He has albuterol we talked about how to use that today.  I would like him to have a nebulizer for home use.  He has a lot of mucus I would like him to use Mucinex.       Review of Systems    See scanned documents attached to this note for review of systems, and appropriate scales/scores for this visit.     Objective   Physical Exam  Constitutional:       Appearance: Normal appearance.   HENT:      Head: Normocephalic and atraumatic.      Mouth/Throat:      Pharynx: Oropharynx is clear.   Cardiovascular:      Rate and Rhythm: Normal rate and regular rhythm.      Pulses: Normal pulses.      Heart sounds: Normal heart sounds.   Pulmonary:      Effort: Pulmonary effort is normal.      Breath sounds: Normal breath sounds. No wheezing, rhonchi or rales.   Abdominal:      General: Bowel sounds are normal.      Palpations: Abdomen is soft.   Musculoskeletal:         General: Normal range of motion.   Skin:     General: Skin is warm and dry.   Neurological:      General: No focal deficit present.      Mental Status: He is alert and  oriented to person, place, and time.   Psychiatric:         Mood and Affect: Mood normal.         Assessment/Plan     # SOB   - frequent bronchitis    - breathing has worsened over the last year    - lots of chemical exposure    - CT shows severe emphysema    - he has albuterol and uses it frequently    - he started Symbicort on 1/31/2024, trial of breztri if covered by insuarance, if not continue symbicort and can add spiriva   -Encouraged him to also use Mucinex  - referral to pulmonary rehab sent     # Heart Failure   - 3rd degree HB   - Pacer   - He follows with cardiology     Follow up 3 months       Geoffrey Field MD 05/09/24 8:30 AM

## 2024-05-09 NOTE — SIGNIFICANT EVENT
Cath Lab Procedure Call Back  Procedure Date: ___5/8/2024____________   Procedure Performed:___LHC_________________  Physician:_Dr.krishnan______________  Spoke with:__________PT___________________  Date/Time Contacted: 1st attempt: ___13:22______ ___:____ 2nd attempt: _________ ___:____  Phone#:_______________ Unable to reach/Left message:____________  Access Site: Pain______Bleeding______Bruised______Infection______WNL___X___  Dressing Removal Date:___5/9/2024___________ Anticoagulation Post Intervention_________  Satisfied with Care:_______X_________ D/C Instructions adequate______X_________  Follow Up Appointment:________X_____________ Length of Call:_______3mins___________  Comments:______________________________________________________________________________________________________________________________________________

## 2024-05-29 ENCOUNTER — APPOINTMENT (OUTPATIENT)
Dept: PULMONOLOGY | Facility: CLINIC | Age: 69
End: 2024-05-29
Payer: MEDICARE

## 2024-06-10 LAB
ATRIAL RATE: 63 BPM
P AXIS: 90 DEGREES
P OFFSET: 138 MS
P ONSET: 106 MS
PR INTERVAL: 180 MS
Q ONSET: 196 MS
QRS COUNT: 11 BEATS
QRS DURATION: 154 MS
QT INTERVAL: 438 MS
QTC CALCULATION(BAZETT): 448 MS
QTC FREDERICIA: 445 MS
R AXIS: -83 DEGREES
T AXIS: 96 DEGREES
T OFFSET: 415 MS
VENTRICULAR RATE: 63 BPM

## 2024-08-08 ENCOUNTER — APPOINTMENT (OUTPATIENT)
Dept: PULMONOLOGY | Facility: CLINIC | Age: 69
End: 2024-08-08
Payer: MEDICARE

## 2024-08-08 VITALS
SYSTOLIC BLOOD PRESSURE: 142 MMHG | HEART RATE: 68 BPM | DIASTOLIC BLOOD PRESSURE: 76 MMHG | WEIGHT: 153.2 LBS | OXYGEN SATURATION: 96 % | BODY MASS INDEX: 21.98 KG/M2

## 2024-08-08 DIAGNOSIS — I44.2 HEART BLOCK AV THIRD DEGREE (MULTI): ICD-10-CM

## 2024-08-08 DIAGNOSIS — J43.2 CENTRILOBULAR EMPHYSEMA (MULTI): Primary | ICD-10-CM

## 2024-08-08 DIAGNOSIS — J42 CHRONIC BRONCHITIS, UNSPECIFIED CHRONIC BRONCHITIS TYPE (MULTI): ICD-10-CM

## 2024-08-08 DIAGNOSIS — R09.82 POST-NASAL DRIP: ICD-10-CM

## 2024-08-08 PROCEDURE — 1036F TOBACCO NON-USER: CPT | Performed by: NURSE PRACTITIONER

## 2024-08-08 PROCEDURE — 1160F RVW MEDS BY RX/DR IN RCRD: CPT | Performed by: NURSE PRACTITIONER

## 2024-08-08 PROCEDURE — 1159F MED LIST DOCD IN RCRD: CPT | Performed by: NURSE PRACTITIONER

## 2024-08-08 PROCEDURE — 99215 OFFICE O/P EST HI 40 MIN: CPT | Performed by: NURSE PRACTITIONER

## 2024-08-08 RX ORDER — BUDESONIDE, GLYCOPYRROLATE, AND FORMOTEROL FUMARATE 160; 9; 4.8 UG/1; UG/1; UG/1
2 AEROSOL, METERED RESPIRATORY (INHALATION)
Qty: 10 G | Refills: 11 | Status: SHIPPED | OUTPATIENT
Start: 2024-08-08

## 2024-08-08 RX ORDER — ALBUTEROL SULFATE 90 UG/1
2 INHALANT RESPIRATORY (INHALATION) EVERY 4 HOURS PRN
Qty: 18 G | Refills: 11 | Status: SHIPPED | OUTPATIENT
Start: 2024-08-08

## 2024-08-08 RX ORDER — IPRATROPIUM BROMIDE 21 UG/1
2 SPRAY, METERED NASAL EVERY 12 HOURS
Qty: 30 ML | Refills: 12 | Status: SHIPPED | OUTPATIENT
Start: 2024-08-08 | End: 2025-08-08

## 2024-08-08 RX ORDER — BUDESONIDE, GLYCOPYRROLATE, AND FORMOTEROL FUMARATE 160; 9; 4.8 UG/1; UG/1; UG/1
2 AEROSOL, METERED RESPIRATORY (INHALATION)
COMMUNITY
End: 2024-08-08 | Stop reason: SDUPTHER

## 2024-08-08 ASSESSMENT — ENCOUNTER SYMPTOMS: SHORTNESS OF BREATH: 1

## 2024-08-08 NOTE — PROGRESS NOTES
Subjective   Patient ID: Timmy Mayer is a 69 y.o. male who presents for COPD/emphysema    HPI    08/08/24 he is here today for follow-up.  He is using Breztri.  He notes that he gets very short of breath with certain activities including showering.  He does have his nebulizer and albuterol at home.  I refilled everything today.  I want to check him for nighttime oxygen needs.  He really struggles at night and wakes up breathless.  He is seeing Dr. Braxton on for his heart.  Patient is really working on managing his diseases    2/28/2024:  Mr Mayer is doing about the same.  He is using symbicort and albuterol and feels that helps.  He does get short of breath with walking.  He did a PFT that shows very severe obstruction with small airways bronchodilator response.  He did not desaturate with walking (Dom saturation 92%).  He continues to smoke marijuana    Initial visit Ana Lilly 2/28/2024:   New pt here today to establish care. He has been sob with a cough.  He tells me this has been going on for a couple of years but over the last year he has worsened.  Chest CT does show that he has central lobar emphysema.  He does tell me that he was told previously he had COPD.  We had a long talk today about what COPD is and how we treated it.  He is currently on Symbicort he started about 3 weeks ago I would like him to continue.  He has albuterol we talked about how to use that today.  I would like him to have a nebulizer for home use.  He has a lot of mucus I would like him to use Mucinex.       Review of Systems   Respiratory:  Positive for shortness of breath.    All other systems reviewed and are negative.      See scanned documents attached to this note for review of systems, and appropriate scales/scores for this visit.     Objective   Physical Exam  Constitutional:       Appearance: Normal appearance.   HENT:      Head: Normocephalic and atraumatic.      Mouth/Throat:      Pharynx: Oropharynx is clear.    Cardiovascular:      Rate and Rhythm: Normal rate and regular rhythm.      Pulses: Normal pulses.      Heart sounds: Normal heart sounds.   Pulmonary:      Effort: Pulmonary effort is normal.      Breath sounds: Normal breath sounds. No wheezing, rhonchi or rales.   Abdominal:      General: Bowel sounds are normal.      Palpations: Abdomen is soft.   Musculoskeletal:         General: Normal range of motion.   Skin:     General: Skin is warm and dry.   Neurological:      General: No focal deficit present.      Mental Status: He is alert and oriented to person, place, and time.   Psychiatric:         Mood and Affect: Mood normal.         Assessment/Plan     # Very severe COPD FEV1 29% found on both PFT and Chest CT   - frequent bronchitis    - breathing has worsened over the last year    - lots of chemical exposure    - CT shows severe emphysema    - He quit smoking 7 years ago    - he has albuterol and uses it frequently    - he started Symbicort on 1/31/2024, trial of breztri if covered by insuarance, if not continue symbicort and can add spiriva   -Encouraged him to also use Mucinex  - referral to pulmonary rehab sent  08/08/24 he did not need 02 during the walk test. He is using Breztri and still needs his alubterol several times a day. We talked about managing his COPD    # Hypoxia   - will test for 02 needs with sleep      # Heart Failure   - 3rd degree HB   - Pacer   - EF 43%   - He follows with cardiology Dr. Rhodes     Follow up 3-4  months with Dr. Field for your very severe COPD       TAMI Thakkar-CNP 08/08/24 8:08 AM

## 2024-08-13 ENCOUNTER — TELEPHONE (OUTPATIENT)
Dept: PULMONOLOGY | Facility: CLINIC | Age: 69
End: 2024-08-13
Payer: MEDICARE

## 2024-08-13 NOTE — TELEPHONE ENCOUNTER
BELÉN   Pt called stating he wanted to speak to Ana Lilly  and that he was having a hard time breathing along with fever  . I suggested to the patient that he go to the ER or the Urgent Care.    He then said he could hardly make it to get dressed and then I suggested he call 911 to get an ambulance. Pt stated he would be compliant at the suggestion.

## 2024-08-26 DIAGNOSIS — J43.2 CENTRILOBULAR EMPHYSEMA (MULTI): Primary | ICD-10-CM

## 2024-11-15 ENCOUNTER — APPOINTMENT (OUTPATIENT)
Dept: PULMONOLOGY | Facility: CLINIC | Age: 69
End: 2024-11-15
Payer: MEDICARE

## 2024-11-15 VITALS
SYSTOLIC BLOOD PRESSURE: 145 MMHG | HEART RATE: 69 BPM | WEIGHT: 155 LBS | DIASTOLIC BLOOD PRESSURE: 77 MMHG | OXYGEN SATURATION: 96 % | BODY MASS INDEX: 22.24 KG/M2

## 2024-11-15 DIAGNOSIS — J40 BRONCHITIS: Primary | ICD-10-CM

## 2024-11-15 DIAGNOSIS — J44.9 CHRONIC OBSTRUCTIVE PULMONARY DISEASE, UNSPECIFIED COPD TYPE (MULTI): ICD-10-CM

## 2024-11-15 DIAGNOSIS — G47.34 NOCTURNAL HYPOXEMIA: ICD-10-CM

## 2024-11-15 PROCEDURE — 1036F TOBACCO NON-USER: CPT | Performed by: PEDIATRICS

## 2024-11-15 PROCEDURE — 1159F MED LIST DOCD IN RCRD: CPT | Performed by: PEDIATRICS

## 2024-11-15 PROCEDURE — 99215 OFFICE O/P EST HI 40 MIN: CPT | Performed by: PEDIATRICS

## 2024-11-15 PROCEDURE — 1160F RVW MEDS BY RX/DR IN RCRD: CPT | Performed by: PEDIATRICS

## 2024-11-15 RX ORDER — DOXYCYCLINE 100 MG/1
100 CAPSULE ORAL 2 TIMES DAILY
Qty: 20 CAPSULE | Refills: 0 | Status: SHIPPED | OUTPATIENT
Start: 2024-11-15 | End: 2024-11-25

## 2024-11-15 RX ORDER — TIOTROPIUM BROMIDE INHALATION SPRAY 3.12 UG/1
2 SPRAY, METERED RESPIRATORY (INHALATION) DAILY
Qty: 8 G | Refills: 11 | Status: SHIPPED | OUTPATIENT
Start: 2024-11-15 | End: 2025-11-15

## 2024-11-15 RX ORDER — PREDNISONE 10 MG/1
TABLET ORAL
Qty: 38 TABLET | Refills: 0 | Status: SHIPPED | OUTPATIENT
Start: 2024-11-15

## 2024-11-15 NOTE — PROGRESS NOTES
Abdomen , soft, nontender, nondistended , no guarding or rigidity , no masses palpable , normal bowel sounds , Liver and Spleen , no hepatomegaly present , no hepatosplenomegaly , liver nontender , spleen not palpable , Abdomen , soft, nontender, nondistended , no guarding or rigidity , no masses palpable , normal bowel sounds , Liver and Spleen , no hepatomegaly present , no hepatosplenomegaly , liver nontender , spleen not palpable Subjective   Patient ID: Timmy Mayer is a 69 y.o. male who presents for COPD, hypoxia    HPI      11/15/2024:  Mr Mayer was not able to get the breztri,  he is using symbicort instead.  He is using oxygen at night.  Lately he has had increased cough and shortness of breath with yellow phlegm.    08/08/24 he is here today for follow-up.  He is using Breztri.  He notes that he gets very short of breath with certain activities including showering.  He does have his nebulizer and albuterol at home.  I refilled everything today.  I want to check him for nighttime oxygen needs.  He really struggles at night and wakes up breathless.  He is seeing Dr. Braxton on for his heart.  Patient is really working on managing his diseases     2/28/2024:  Mr Mayer is doing about the same.  He is using symbicort and albuterol and feels that helps.  He does get short of breath with walking.  He did a PFT that shows very severe obstruction with small airways bronchodilator response.  He did not desaturate with walking (Dom saturation 92%).  He continues to smoke marijuana     Initial visit Ana Lilly 2/28/2024:   New pt here today to establish care. He has been sob with a cough.  He tells me this has been going on for a couple of years but over the last year he has worsened.  Chest CT does show that he has central lobar emphysema.  He does tell me that he was told previously he had COPD.  We had a long talk today about what COPD is and how we treated it.  He is currently on Symbicort he started about 3 weeks ago I would like him to continue.  He has albuterol we talked about how to use that today.  I would like him to have a nebulizer for home use.  He has a lot of mucus I would like him to use Mucinex.        Review of Systems    See scanned documents attached to this note for review of systems, and appropriate scales/scores for this visit.     Objective   Physical Exam  Constitutional:       Appearance: Normal appearance.   HENT:       Head: Normocephalic and atraumatic.      Mouth/Throat:      Pharynx: Oropharynx is clear.   Cardiovascular:      Rate and Rhythm: Normal rate and regular rhythm.      Pulses: Normal pulses.      Heart sounds: Normal heart sounds.   Pulmonary:      Effort: Pulmonary effort is normal.      Breath sounds: No wheezing, rhonchi or rales.      Comments: Course breath sounds  Abdominal:      General: Bowel sounds are normal.      Palpations: Abdomen is soft.   Musculoskeletal:         General: Normal range of motion.   Skin:     General: Skin is warm and dry.   Neurological:      General: No focal deficit present.      Mental Status: He is alert and oriented to person, place, and time.   Psychiatric:         Mood and Affect: Mood normal.       Assessment/Plan     # Very severe COPD FEV1 29% found on both PFT and Chest CT   - frequent bronchitis    - breathing has worsened over the last year    - lots of chemical exposure    - CT shows severe emphysema    - He quit smoking 7 years ago    - he has albuterol and uses it frequently    - he started Symbicort on 1/31/2024, trial of breztri if covered by [employer], if not continue symbicort and can add spiriva   -Encouraged him to also use Mucinex  - referral to pulmonary rehab sent  08/08/24 he did not need 02 during the walk test. He is using Breztri and still needs his alubterol several times a day. 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 Rhodes      Follow up 6 months       Geoffrey Field MD 11/15/24 8:01 AM

## 2024-12-04 ENCOUNTER — TELEPHONE (OUTPATIENT)
Dept: PULMONOLOGY | Facility: CLINIC | Age: 69
End: 2024-12-04
Payer: MEDICARE

## 2024-12-04 DIAGNOSIS — J40 BRONCHITIS: Primary | ICD-10-CM

## 2024-12-04 RX ORDER — PREDNISONE 10 MG/1
TABLET ORAL
Qty: 38 TABLET | Refills: 0 | Status: SHIPPED | OUTPATIENT
Start: 2024-12-04

## 2024-12-04 RX ORDER — DOXYCYCLINE HYCLATE 100 MG
100 TABLET ORAL 2 TIMES DAILY
Qty: 20 TABLET | Refills: 0 | Status: SHIPPED | OUTPATIENT
Start: 2024-12-04 | End: 2024-12-14

## 2024-12-04 NOTE — TELEPHONE ENCOUNTER
Pt called and is needing antibiotic sent in  [facility] in [place] coughing up phlegm greenish wite no fever no tightness of chest but hard to breath pt said he has  had this for a week or 2. Pt wanted to know does he take both breztri and symibcort both or just one or the other?      Thank you!

## 2025-01-02 ENCOUNTER — TELEPHONE (OUTPATIENT)
Dept: PULMONOLOGY | Facility: CLINIC | Age: 70
End: 2025-01-02
Payer: MEDICARE

## 2025-01-02 NOTE — TELEPHONE ENCOUNTER
Pt is still having problem with having a cough and green Sputum since before Isidro. He would like to do something  for this . He states this would be his 3rd time at getting medication and will do anything to get rid of it and his wife and he are passing it back and forth .

## 2025-01-03 DIAGNOSIS — J40 BRONCHITIS: Primary | ICD-10-CM

## 2025-01-03 RX ORDER — LEVOFLOXACIN 500 MG/1
500 TABLET, FILM COATED ORAL EVERY 24 HOURS
Qty: 10 TABLET | Refills: 0 | Status: SHIPPED | OUTPATIENT
Start: 2025-01-03 | End: 2025-01-13

## 2025-01-03 RX ORDER — PREDNISONE 10 MG/1
TABLET ORAL
Qty: 38 TABLET | Refills: 0 | Status: SHIPPED | OUTPATIENT
Start: 2025-01-03

## 2025-02-11 ENCOUNTER — TELEPHONE (OUTPATIENT)
Dept: PULMONOLOGY | Facility: CLINIC | Age: 70
End: 2025-02-11
Payer: MEDICARE

## 2025-02-11 DIAGNOSIS — J40 BRONCHITIS: Primary | ICD-10-CM

## 2025-02-11 RX ORDER — DOXYCYCLINE HYCLATE 100 MG
100 TABLET ORAL 2 TIMES DAILY
Qty: 20 TABLET | Refills: 0 | Status: SHIPPED | OUTPATIENT
Start: 2025-02-11 | End: 2025-02-21

## 2025-02-11 NOTE — TELEPHONE ENCOUNTER
Pt called and said that his chest feels tight he is congested coughing phlegm greenish in color no fever has had these symptoms for avbout 3 wks cleared up but came back. Pt would like antibiotic called in to [facility] in [facility]

## 2025-02-12 DIAGNOSIS — J40 BRONCHITIS: Primary | ICD-10-CM

## 2025-02-12 RX ORDER — PREDNISONE 10 MG/1
TABLET ORAL
Qty: 38 TABLET | Refills: 0 | Status: SHIPPED | OUTPATIENT
Start: 2025-02-12

## 2025-04-04 ENCOUNTER — PATIENT OUTREACH (OUTPATIENT)
Dept: CARE COORDINATION | Facility: CLINIC | Age: 70
End: 2025-04-04
Payer: MEDICARE

## 2025-05-06 DIAGNOSIS — J43.2 CENTRILOBULAR EMPHYSEMA (MULTI): ICD-10-CM

## 2025-05-19 RX ORDER — IPRATROPIUM BROMIDE AND ALBUTEROL SULFATE 2.5; .5 MG/3ML; MG/3ML
SOLUTION RESPIRATORY (INHALATION)
Qty: 360 ML | Refills: 0 | Status: SHIPPED | OUTPATIENT
Start: 2025-05-19 | End: 2025-05-23 | Stop reason: SDUPTHER

## 2025-05-23 ENCOUNTER — APPOINTMENT (OUTPATIENT)
Dept: PULMONOLOGY | Facility: CLINIC | Age: 70
End: 2025-05-23
Payer: MEDICARE

## 2025-05-23 VITALS
OXYGEN SATURATION: 95 % | SYSTOLIC BLOOD PRESSURE: 136 MMHG | WEIGHT: 160 LBS | HEART RATE: 65 BPM | DIASTOLIC BLOOD PRESSURE: 81 MMHG | BODY MASS INDEX: 22.96 KG/M2

## 2025-05-23 DIAGNOSIS — R09.82 POST-NASAL DRIP: ICD-10-CM

## 2025-05-23 DIAGNOSIS — J43.2 CENTRILOBULAR EMPHYSEMA (MULTI): ICD-10-CM

## 2025-05-23 DIAGNOSIS — R09.02 HYPOXIA: Primary | ICD-10-CM

## 2025-05-23 PROCEDURE — 99215 OFFICE O/P EST HI 40 MIN: CPT | Performed by: PEDIATRICS

## 2025-05-23 PROCEDURE — 1036F TOBACCO NON-USER: CPT | Performed by: PEDIATRICS

## 2025-05-23 PROCEDURE — 1159F MED LIST DOCD IN RCRD: CPT | Performed by: PEDIATRICS

## 2025-05-23 PROCEDURE — 1160F RVW MEDS BY RX/DR IN RCRD: CPT | Performed by: PEDIATRICS

## 2025-05-23 RX ORDER — IPRATROPIUM BROMIDE AND ALBUTEROL SULFATE 2.5; .5 MG/3ML; MG/3ML
3 SOLUTION RESPIRATORY (INHALATION)
Qty: 270 ML | Refills: 11 | Status: SHIPPED | OUTPATIENT
Start: 2025-05-23

## 2025-05-23 RX ORDER — BUDESONIDE, GLYCOPYRROLATE, AND FORMOTEROL FUMARATE 160; 9; 4.8 UG/1; UG/1; UG/1
AEROSOL, METERED RESPIRATORY (INHALATION)
Qty: 11 G | Refills: 0 | Status: SHIPPED | OUTPATIENT
Start: 2025-05-23

## 2025-05-23 RX ORDER — ROFLUMILAST 500 UG/1
500 TABLET ORAL DAILY
Qty: 30 TABLET | Refills: 11 | Status: SHIPPED | OUTPATIENT
Start: 2025-05-23

## 2025-05-23 RX ORDER — ROFLUMILAST 250 UG/1
250 TABLET ORAL DAILY
Qty: 30 TABLET | Refills: 0 | Status: SHIPPED | OUTPATIENT
Start: 2025-05-23 | End: 2025-06-22

## 2025-05-23 NOTE — PROGRESS NOTES
Subjective   Patient ID: Timmy Mayer is a 70 y.o. male who presents for COPD/emphysema, hypoxia    HPI    5/23/2025:  Mr Mayer is about the same.  He gets short of breath with modest activity.  He is using breztri and duoneb 3 times a day.  He is using oxygen at night.  He continues to smoke.      11/15/2024:  Mr Mayer was not able to get the breztri,  he is using symbicort instead.  He is using oxygen at night.  Lately he has had increased cough and shortness of breath with yellow phlegm.     08/08/24 he is here today for follow-up.  He is using Breztri.  He notes that he gets very short of breath with certain activities including showering.  He does have his nebulizer and albuterol at home.  I refilled everything today.  I want to check him for nighttime oxygen needs.  He really struggles at night and wakes up breathless.  He is seeing Dr. Braxton on for his heart.  Patient is really working on managing his diseases     2/28/2024:  Mr Mayer is doing about the same.  He is using symbicort and albuterol and feels that helps.  He does get short of breath with walking.  He did a PFT that shows very severe obstruction with small airways bronchodilator response.  He did not desaturate with walking (Dom saturation 92%).  He continues to smoke marijuana     Initial visit Ana Lilly 2/28/2024:   New pt here today to establish care. He has been sob with a cough.  He tells me this has been going on for a couple of years but over the last year he has worsened.  Chest CT does show that he has central lobar emphysema.  He does tell me that he was told previously he had COPD.  We had a long talk today about what COPD is and how we treated it.  He is currently on Symbicort he started about 3 weeks ago I would like him to continue.  He has albuterol we talked about how to use that today.  I would like him to have a nebulizer for home use.  He has a lot of mucus I would like him to use Mucinex.     Review of Systems    See scanned  documents attached to this note for review of systems, and appropriate scales/scores for this visit.     Objective   Physical Exam  Constitutional:       Appearance: Normal appearance.   HENT:      Head: Normocephalic and atraumatic.      Mouth/Throat:      Pharynx: Oropharynx is clear.   Cardiovascular:      Rate and Rhythm: Normal rate and regular rhythm.      Pulses: Normal pulses.      Heart sounds: Normal heart sounds.   Pulmonary:      Effort: Pulmonary effort is normal.      Breath sounds: Normal breath sounds. No wheezing, rhonchi or rales.   Abdominal:      General: Bowel sounds are normal.      Palpations: Abdomen is soft.   Musculoskeletal:         General: Normal range of motion.   Skin:     General: Skin is warm and dry.   Neurological:      General: No focal deficit present.      Mental Status: He is alert and oriented to person, place, and time.   Psychiatric:         Mood and Affect: Mood normal.       Assessment/Plan     # Very severe COPD FEV1 29% found on both PFT and Chest CT   - frequent bronchitis    - breathing has worsened over the last year    - lots of chemical exposure    - CT shows severe emphysema    - He quit smoking 7 years ago    - he has albuterol and uses it frequently    - he started Symbicort on 1/31/2024, trial of breztri if covered by [employer], if not continue symbicort and can add spiriva   -Encouraged him to also use Mucinex  - referral to pulmonary rehab sent  08/08/24 he did not need 02 during the walk test. He is using Breztri and still needs his alubterol several times a day. We talked about managing his COPD  11/15/2024:  continue symbicort (breztri too expensive) will add spiriva respimat (looks like that is covered by insurance)  5/23/2025:  continue with breztri and duoneb (renewed duoneb today)  given he feels better with prednisone and antibiotics but then goes back to being more short of breath I will trial daliresp to see if we can inprove his baseline.  We may  need to consider daily low dose prednisone if this does not help.     # Hypoxia   11/15/2024L  continue oxygen at night  5/23/2025: continue oxygen at night     # Heart Failure   - 3rd degree HB   - Pacer   - EF 43%   - He follows with cardiology Dr. Rhodes      Follow up 6 months       Geoffrey Field MD 05/23/25 7:50 AM

## 2025-06-18 DIAGNOSIS — J43.2 CENTRILOBULAR EMPHYSEMA (MULTI): ICD-10-CM

## 2025-06-18 RX ORDER — IPRATROPIUM BROMIDE AND ALBUTEROL SULFATE 2.5; .5 MG/3ML; MG/3ML
SOLUTION RESPIRATORY (INHALATION)
Qty: 360 ML | Refills: 5 | Status: SHIPPED | OUTPATIENT
Start: 2025-06-18

## 2025-06-26 ENCOUNTER — TELEPHONE (OUTPATIENT)
Dept: PRIMARY CARE | Facility: CLINIC | Age: 70
End: 2025-06-26
Payer: MEDICARE

## 2025-08-11 DIAGNOSIS — J43.2 CENTRILOBULAR EMPHYSEMA (MULTI): ICD-10-CM

## 2025-08-11 DIAGNOSIS — J42 CHRONIC BRONCHITIS, UNSPECIFIED CHRONIC BRONCHITIS TYPE (MULTI): ICD-10-CM

## 2025-08-11 RX ORDER — ALBUTEROL SULFATE 90 UG/1
2 INHALANT RESPIRATORY (INHALATION) EVERY 4 HOURS PRN
Qty: 18 G | Refills: 11 | Status: SHIPPED | OUTPATIENT
Start: 2025-08-11

## 2025-08-11 RX ORDER — BUDESONIDE, GLYCOPYRROLATE, AND FORMOTEROL FUMARATE 160; 9; 4.8 UG/1; UG/1; UG/1
2 AEROSOL, METERED RESPIRATORY (INHALATION)
Qty: 11 G | Refills: 0 | Status: SHIPPED | OUTPATIENT
Start: 2025-08-11

## 2025-12-05 ENCOUNTER — APPOINTMENT (OUTPATIENT)
Dept: PULMONOLOGY | Facility: CLINIC | Age: 70
End: 2025-12-05
Payer: MEDICARE

## (undated) DEVICE — SHEATH, PINNACLE, 10 CM,  6FR INTRODUCER, 6FR DIA, 2.5 CM DIALATOR

## (undated) DEVICE — Device

## (undated) DEVICE — INFLATION DEVICE, BASIXCOMPAX, 30 ATM/BAR, 20ML  MAP152

## (undated) DEVICE — INTRODUCER SYSTEM, PRELUDE SNAP, SPLITTABLE, 9 FR X 13 CM

## (undated) DEVICE — ANGIO KIT, LEFT HEART, LF, CUSTOM

## (undated) DEVICE — ACCESS SYSTEM, PINNACLE PRECISION, 5FR X 10CM, ECHOGENIC NEEDLE

## (undated) DEVICE — INTRODUCER SYSTEM, PRELUDE SNAP, SPLITTABLE, HEMOSTATIC, 7FR

## (undated) DEVICE — NEEDLE, MERIT ADVANCE, ONE WALL,  21GA X 7.0CM, STANDARD SMOOTH

## (undated) DEVICE — GUIDEWIRE, RUN THROUGH WIRE, 180CM

## (undated) DEVICE — INTRODUCER, SHEATH, FAST-CATH, 6.5FR X 12CM, C-LOCK

## (undated) DEVICE — CLOSURE DEVICE, VASCULAR, ANGIO-SEAL, VIP, 6FR, LF

## (undated) DEVICE — CATHETER, ANGIO, IMPULSE, FL4, 5 FR X 100 CM

## (undated) DEVICE — 5FR DXTERITY 3DRC DIAGNOSTIC CATHETER, .038 100 CM RADIAL

## (undated) DEVICE — MANIFOLD KIT, CUSTOM, GEAUGA

## (undated) DEVICE — SLEEVE, REPOSITIONING, W/C-LOCK ADAPTER, 60 CM

## (undated) DEVICE — CATHETER, BALLOON, NC EUPHORA NONCOMPLIANT 2.5 X 15 X 142CM

## (undated) DEVICE — COVER, PROBE, PULL UP ULTRASOUND KIT, 5 X 48

## (undated) DEVICE — CATHETER, GUIDING, LAUNCHER, 6FR, JR 4.0

## (undated) DEVICE — ACCESS KIT, S-MAK MINI, 5FR 10CM 0.018IN 40CM, NT/PT, ECHO ENHANCE NEEDLE

## (undated) DEVICE — CATHETER, BALLOON, NC EUPHORA NONCOMPLIANT 2.5 X 12 X 142CM